# Patient Record
Sex: FEMALE | Race: WHITE | Employment: UNEMPLOYED | URBAN - METROPOLITAN AREA
[De-identification: names, ages, dates, MRNs, and addresses within clinical notes are randomized per-mention and may not be internally consistent; named-entity substitution may affect disease eponyms.]

---

## 2020-03-26 ENCOUNTER — PREP FOR PROCEDURE (OUTPATIENT)
Dept: UROLOGY | Facility: CLINIC | Age: 50
End: 2020-03-26

## 2020-03-26 ENCOUNTER — APPOINTMENT (OUTPATIENT)
Dept: GENERAL RADIOLOGY | Facility: CLINIC | Age: 50
DRG: 854 | End: 2020-03-26
Attending: INTERNAL MEDICINE
Payer: OTHER GOVERNMENT

## 2020-03-26 ENCOUNTER — APPOINTMENT (OUTPATIENT)
Dept: CT IMAGING | Facility: CLINIC | Age: 50
DRG: 854 | End: 2020-03-26
Attending: EMERGENCY MEDICINE
Payer: OTHER GOVERNMENT

## 2020-03-26 ENCOUNTER — ANESTHESIA EVENT (OUTPATIENT)
Dept: SURGERY | Facility: CLINIC | Age: 50
DRG: 854 | End: 2020-03-26
Payer: OTHER GOVERNMENT

## 2020-03-26 ENCOUNTER — ANESTHESIA (OUTPATIENT)
Dept: SURGERY | Facility: CLINIC | Age: 50
DRG: 854 | End: 2020-03-26
Payer: OTHER GOVERNMENT

## 2020-03-26 ENCOUNTER — HOSPITAL ENCOUNTER (INPATIENT)
Facility: CLINIC | Age: 50
LOS: 2 days | Discharge: HOME | DRG: 854 | End: 2020-03-28
Attending: EMERGENCY MEDICINE | Admitting: INTERNAL MEDICINE
Payer: OTHER GOVERNMENT

## 2020-03-26 DIAGNOSIS — N20.1 LEFT URETERAL CALCULUS: Primary | ICD-10-CM

## 2020-03-26 DIAGNOSIS — N20.1 URETEROLITHIASIS: Primary | ICD-10-CM

## 2020-03-26 DIAGNOSIS — N20.1 LEFT URETERAL CALCULUS: ICD-10-CM

## 2020-03-26 DIAGNOSIS — N20.0 KIDNEY STONE ON LEFT SIDE: ICD-10-CM

## 2020-03-26 DIAGNOSIS — A41.9 BACTERIAL SEPSIS (H): ICD-10-CM

## 2020-03-26 LAB
ALBUMIN UR-MCNC: 10 MG/DL
ANION GAP SERPL CALCULATED.3IONS-SCNC: 7 MMOL/L (ref 3–14)
APPEARANCE UR: ABNORMAL
BACTERIA #/AREA URNS HPF: ABNORMAL /HPF
BASOPHILS # BLD AUTO: 0.1 10E9/L (ref 0–0.2)
BASOPHILS NFR BLD AUTO: 0.4 %
BILIRUB UR QL STRIP: NEGATIVE
BUN SERPL-MCNC: 22 MG/DL (ref 7–30)
CALCIUM SERPL-MCNC: 8.9 MG/DL (ref 8.5–10.1)
CHLORIDE SERPL-SCNC: 106 MMOL/L (ref 94–109)
CO2 SERPL-SCNC: 23 MMOL/L (ref 20–32)
COLOR UR AUTO: ABNORMAL
CREAT SERPL-MCNC: 1.07 MG/DL (ref 0.52–1.04)
DIFFERENTIAL METHOD BLD: ABNORMAL
EOSINOPHIL # BLD AUTO: 0.2 10E9/L (ref 0–0.7)
EOSINOPHIL NFR BLD AUTO: 1.2 %
ERYTHROCYTE [DISTWIDTH] IN BLOOD BY AUTOMATED COUNT: 14.2 % (ref 10–15)
GFR SERPL CREATININE-BSD FRML MDRD: 62 ML/MIN/{1.73_M2}
GLUCOSE SERPL-MCNC: 104 MG/DL (ref 70–99)
GLUCOSE UR STRIP-MCNC: NEGATIVE MG/DL
HCT VFR BLD AUTO: 42.6 % (ref 35–47)
HGB BLD-MCNC: 14.2 G/DL (ref 11.7–15.7)
HGB UR QL STRIP: ABNORMAL
IMM GRANULOCYTES # BLD: 0.1 10E9/L (ref 0–0.4)
IMM GRANULOCYTES NFR BLD: 0.4 %
KETONES UR STRIP-MCNC: 20 MG/DL
LACTATE BLD-SCNC: 1.2 MMOL/L (ref 0.7–2)
LEUKOCYTE ESTERASE UR QL STRIP: ABNORMAL
LYMPHOCYTES # BLD AUTO: 2.2 10E9/L (ref 0.8–5.3)
LYMPHOCYTES NFR BLD AUTO: 11.9 %
MCH RBC QN AUTO: 31.8 PG (ref 26.5–33)
MCHC RBC AUTO-ENTMCNC: 33.3 G/DL (ref 31.5–36.5)
MCV RBC AUTO: 96 FL (ref 78–100)
MONOCYTES # BLD AUTO: 1.7 10E9/L (ref 0–1.3)
MONOCYTES NFR BLD AUTO: 9.5 %
NEUTROPHILS # BLD AUTO: 13.9 10E9/L (ref 1.6–8.3)
NEUTROPHILS NFR BLD AUTO: 76.6 %
NITRATE UR QL: NEGATIVE
NRBC # BLD AUTO: 0 10*3/UL
NRBC BLD AUTO-RTO: 0 /100
PH UR STRIP: 5.5 PH (ref 5–7)
PLATELET # BLD AUTO: 263 10E9/L (ref 150–450)
POTASSIUM SERPL-SCNC: 3.8 MMOL/L (ref 3.4–5.3)
RBC # BLD AUTO: 4.46 10E12/L (ref 3.8–5.2)
RBC #/AREA URNS AUTO: 9 /HPF (ref 0–2)
SODIUM SERPL-SCNC: 136 MMOL/L (ref 133–144)
SOURCE: ABNORMAL
SP GR UR STRIP: 1.02 (ref 1–1.03)
SQUAMOUS #/AREA URNS AUTO: 2 /HPF (ref 0–1)
UROBILINOGEN UR STRIP-MCNC: NORMAL MG/DL (ref 0–2)
WBC # BLD AUTO: 18.2 10E9/L (ref 4–11)
WBC #/AREA URNS AUTO: >182 /HPF (ref 0–5)
WBC CLUMPS #/AREA URNS HPF: PRESENT /HPF

## 2020-03-26 PROCEDURE — 87086 URINE CULTURE/COLONY COUNT: CPT | Performed by: EMERGENCY MEDICINE

## 2020-03-26 PROCEDURE — 40000277 XR SURGERY CARM FLUORO LESS THAN 5 MIN W STILLS: Mod: TC

## 2020-03-26 PROCEDURE — 36000054 ZZH SURGERY LEVEL 2 W FLUORO 1ST 30 MIN: Performed by: UROLOGY

## 2020-03-26 PROCEDURE — 85025 COMPLETE CBC W/AUTO DIFF WBC: CPT | Performed by: EMERGENCY MEDICINE

## 2020-03-26 PROCEDURE — 37000008 ZZH ANESTHESIA TECHNICAL FEE, 1ST 30 MIN: Performed by: UROLOGY

## 2020-03-26 PROCEDURE — 25000128 H RX IP 250 OP 636: Performed by: UROLOGY

## 2020-03-26 PROCEDURE — 25800030 ZZH RX IP 258 OP 636: Performed by: INTERNAL MEDICINE

## 2020-03-26 PROCEDURE — 87088 URINE BACTERIA CULTURE: CPT | Performed by: EMERGENCY MEDICINE

## 2020-03-26 PROCEDURE — 37000009 ZZH ANESTHESIA TECHNICAL FEE, EACH ADDTL 15 MIN: Performed by: UROLOGY

## 2020-03-26 PROCEDURE — 25000132 ZZH RX MED GY IP 250 OP 250 PS 637: Performed by: INTERNAL MEDICINE

## 2020-03-26 PROCEDURE — 25000125 ZZHC RX 250: Performed by: ANESTHESIOLOGY

## 2020-03-26 PROCEDURE — 12000013 ZZH R&B PEDS

## 2020-03-26 PROCEDURE — 99285 EMERGENCY DEPT VISIT HI MDM: CPT | Mod: 25

## 2020-03-26 PROCEDURE — 25000125 ZZHC RX 250: Performed by: NURSE ANESTHETIST, CERTIFIED REGISTERED

## 2020-03-26 PROCEDURE — 87186 SC STD MICRODIL/AGAR DIL: CPT | Performed by: EMERGENCY MEDICINE

## 2020-03-26 PROCEDURE — 25000128 H RX IP 250 OP 636: Performed by: NURSE ANESTHETIST, CERTIFIED REGISTERED

## 2020-03-26 PROCEDURE — 27210794 ZZH OR GENERAL SUPPLY STERILE: Performed by: UROLOGY

## 2020-03-26 PROCEDURE — 80048 BASIC METABOLIC PNL TOTAL CA: CPT | Performed by: EMERGENCY MEDICINE

## 2020-03-26 PROCEDURE — 99223 1ST HOSP IP/OBS HIGH 75: CPT | Mod: AI | Performed by: INTERNAL MEDICINE

## 2020-03-26 PROCEDURE — 83605 ASSAY OF LACTIC ACID: CPT | Performed by: EMERGENCY MEDICINE

## 2020-03-26 PROCEDURE — 25000132 ZZH RX MED GY IP 250 OP 250 PS 637: Performed by: UROLOGY

## 2020-03-26 PROCEDURE — 96365 THER/PROPH/DIAG IV INF INIT: CPT

## 2020-03-26 PROCEDURE — 0T778DZ DILATION OF LEFT URETER WITH INTRALUMINAL DEVICE, VIA NATURAL OR ARTIFICIAL OPENING ENDOSCOPIC: ICD-10-PCS | Performed by: UROLOGY

## 2020-03-26 PROCEDURE — 74176 CT ABD & PELVIS W/O CONTRAST: CPT

## 2020-03-26 PROCEDURE — 96361 HYDRATE IV INFUSION ADD-ON: CPT

## 2020-03-26 PROCEDURE — 81001 URINALYSIS AUTO W/SCOPE: CPT | Performed by: EMERGENCY MEDICINE

## 2020-03-26 PROCEDURE — 25800025 ZZH RX 258: Performed by: UROLOGY

## 2020-03-26 PROCEDURE — C2617 STENT, NON-COR, TEM W/O DEL: HCPCS | Performed by: UROLOGY

## 2020-03-26 PROCEDURE — 99232 SBSQ HOSP IP/OBS MODERATE 35: CPT | Performed by: UROLOGY

## 2020-03-26 PROCEDURE — 96375 TX/PRO/DX INJ NEW DRUG ADDON: CPT

## 2020-03-26 PROCEDURE — 87040 BLOOD CULTURE FOR BACTERIA: CPT | Performed by: EMERGENCY MEDICINE

## 2020-03-26 PROCEDURE — 40000306 ZZH STATISTIC PRE PROC ASSESS II: Performed by: UROLOGY

## 2020-03-26 PROCEDURE — 25000128 H RX IP 250 OP 636: Performed by: EMERGENCY MEDICINE

## 2020-03-26 PROCEDURE — 25800030 ZZH RX IP 258 OP 636: Performed by: EMERGENCY MEDICINE

## 2020-03-26 PROCEDURE — 71000012 ZZH RECOVERY PHASE 1 LEVEL 1 FIRST HR: Performed by: UROLOGY

## 2020-03-26 PROCEDURE — 52332 CYSTOSCOPY AND TREATMENT: CPT | Mod: LT | Performed by: UROLOGY

## 2020-03-26 PROCEDURE — 25000128 H RX IP 250 OP 636: Performed by: INTERNAL MEDICINE

## 2020-03-26 DEVICE — STENT URETERAL POLARIS ULTRA 5FRX24CM M0061921220: Type: IMPLANTABLE DEVICE | Site: URETER | Status: FUNCTIONAL

## 2020-03-26 RX ORDER — ACETAMINOPHEN 500 MG
1000 TABLET ORAL EVERY 6 HOURS PRN
COMMUNITY

## 2020-03-26 RX ORDER — ONDANSETRON 2 MG/ML
4 INJECTION INTRAMUSCULAR; INTRAVENOUS ONCE
Status: COMPLETED | OUTPATIENT
Start: 2020-03-26 | End: 2020-03-26

## 2020-03-26 RX ORDER — AMLODIPINE BESYLATE 10 MG/1
10 TABLET ORAL DAILY
Status: ON HOLD | COMMUNITY
End: 2021-01-15

## 2020-03-26 RX ORDER — CEFAZOLIN SODIUM 1 G/50ML
1 INJECTION, SOLUTION INTRAVENOUS SEE ADMIN INSTRUCTIONS
Status: DISCONTINUED | OUTPATIENT
Start: 2020-03-26 | End: 2020-03-28

## 2020-03-26 RX ORDER — ONDANSETRON 2 MG/ML
4 INJECTION INTRAMUSCULAR; INTRAVENOUS EVERY 30 MIN PRN
Status: DISCONTINUED | OUTPATIENT
Start: 2020-03-26 | End: 2020-03-26 | Stop reason: HOSPADM

## 2020-03-26 RX ORDER — CEFAZOLIN SODIUM 2 G/100ML
2 INJECTION, SOLUTION INTRAVENOUS
Status: COMPLETED | OUTPATIENT
Start: 2020-03-26 | End: 2020-03-26

## 2020-03-26 RX ORDER — FOLIC ACID 1 MG/1
1 TABLET ORAL DAILY
COMMUNITY

## 2020-03-26 RX ORDER — OXYCODONE HYDROCHLORIDE 5 MG/1
5 TABLET ORAL EVERY 4 HOURS PRN
Status: DISCONTINUED | OUTPATIENT
Start: 2020-03-26 | End: 2020-03-28 | Stop reason: HOSPADM

## 2020-03-26 RX ORDER — FENTANYL CITRATE 50 UG/ML
25-50 INJECTION, SOLUTION INTRAMUSCULAR; INTRAVENOUS
Status: DISCONTINUED | OUTPATIENT
Start: 2020-03-26 | End: 2020-03-26 | Stop reason: HOSPADM

## 2020-03-26 RX ORDER — CETIRIZINE HYDROCHLORIDE 10 MG/1
10 TABLET ORAL DAILY
COMMUNITY

## 2020-03-26 RX ORDER — CEFAZOLIN SODIUM 1 G
1 VIAL (EA) INJECTION SEE ADMIN INSTRUCTIONS
Status: CANCELLED | OUTPATIENT
Start: 2020-03-26

## 2020-03-26 RX ORDER — KETOROLAC TROMETHAMINE 15 MG/ML
15 INJECTION, SOLUTION INTRAMUSCULAR; INTRAVENOUS ONCE
Status: COMPLETED | OUTPATIENT
Start: 2020-03-26 | End: 2020-03-26

## 2020-03-26 RX ORDER — ONDANSETRON 4 MG/1
4 TABLET, ORALLY DISINTEGRATING ORAL EVERY 30 MIN PRN
Status: DISCONTINUED | OUTPATIENT
Start: 2020-03-26 | End: 2020-03-26 | Stop reason: HOSPADM

## 2020-03-26 RX ORDER — LIDOCAINE 40 MG/G
CREAM TOPICAL
Status: DISCONTINUED | OUTPATIENT
Start: 2020-03-26 | End: 2020-03-28

## 2020-03-26 RX ORDER — KETOROLAC TROMETHAMINE 15 MG/ML
15 INJECTION, SOLUTION INTRAMUSCULAR; INTRAVENOUS EVERY 6 HOURS PRN
Status: DISCONTINUED | OUTPATIENT
Start: 2020-03-26 | End: 2020-03-26

## 2020-03-26 RX ORDER — POLYETHYLENE GLYCOL 3350 17 G/17G
17 POWDER, FOR SOLUTION ORAL DAILY PRN
Status: DISCONTINUED | OUTPATIENT
Start: 2020-03-26 | End: 2020-03-28 | Stop reason: HOSPADM

## 2020-03-26 RX ORDER — METHOTREXATE SODIUM 2.5 MG/1
12.5 TABLET ORAL WEEKLY
COMMUNITY

## 2020-03-26 RX ORDER — SCOLOPAMINE TRANSDERMAL SYSTEM 1 MG/1
1 PATCH, EXTENDED RELEASE TRANSDERMAL
Status: DISCONTINUED | OUTPATIENT
Start: 2020-03-26 | End: 2020-03-28 | Stop reason: HOSPADM

## 2020-03-26 RX ORDER — ONDANSETRON 2 MG/ML
INJECTION INTRAMUSCULAR; INTRAVENOUS PRN
Status: DISCONTINUED | OUTPATIENT
Start: 2020-03-26 | End: 2020-03-26

## 2020-03-26 RX ORDER — FLUTICASONE PROPIONATE 50 MCG
2 SPRAY, SUSPENSION (ML) NASAL DAILY PRN
COMMUNITY

## 2020-03-26 RX ORDER — FUROSEMIDE 20 MG/1
10 TABLET ORAL DAILY PRN
Status: ON HOLD | COMMUNITY
End: 2021-01-15

## 2020-03-26 RX ORDER — ONDANSETRON 4 MG/1
4 TABLET, ORALLY DISINTEGRATING ORAL EVERY 6 HOURS PRN
Status: DISCONTINUED | OUTPATIENT
Start: 2020-03-26 | End: 2020-03-28 | Stop reason: HOSPADM

## 2020-03-26 RX ORDER — MULTIVIT-MIN/IRON/FOLIC ACID/K 18-600-40
2000 CAPSULE ORAL DAILY
COMMUNITY

## 2020-03-26 RX ORDER — KETOROLAC TROMETHAMINE 15 MG/ML
15 INJECTION, SOLUTION INTRAMUSCULAR; INTRAVENOUS EVERY 6 HOURS PRN
Status: DISCONTINUED | OUTPATIENT
Start: 2020-03-26 | End: 2020-03-28 | Stop reason: HOSPADM

## 2020-03-26 RX ORDER — DEXAMETHASONE SODIUM PHOSPHATE 4 MG/ML
INJECTION, SOLUTION INTRA-ARTICULAR; INTRALESIONAL; INTRAMUSCULAR; INTRAVENOUS; SOFT TISSUE PRN
Status: DISCONTINUED | OUTPATIENT
Start: 2020-03-26 | End: 2020-03-26

## 2020-03-26 RX ORDER — NALOXONE HYDROCHLORIDE 0.4 MG/ML
.1-.4 INJECTION, SOLUTION INTRAMUSCULAR; INTRAVENOUS; SUBCUTANEOUS
Status: ACTIVE | OUTPATIENT
Start: 2020-03-26 | End: 2020-03-27

## 2020-03-26 RX ORDER — LABETALOL 20 MG/4 ML (5 MG/ML) INTRAVENOUS SYRINGE
10
Status: DISCONTINUED | OUTPATIENT
Start: 2020-03-26 | End: 2020-03-26 | Stop reason: HOSPADM

## 2020-03-26 RX ORDER — HYDROMORPHONE HYDROCHLORIDE 1 MG/ML
.3-.5 INJECTION, SOLUTION INTRAMUSCULAR; INTRAVENOUS; SUBCUTANEOUS EVERY 5 MIN PRN
Status: DISCONTINUED | OUTPATIENT
Start: 2020-03-26 | End: 2020-03-26 | Stop reason: HOSPADM

## 2020-03-26 RX ORDER — IBUPROFEN 200 MG
2 CAPSULE ORAL DAILY
COMMUNITY

## 2020-03-26 RX ORDER — MULTIPLE VITAMINS W/ MINERALS TAB 9MG-400MCG
1 TAB ORAL DAILY
COMMUNITY

## 2020-03-26 RX ORDER — BIOTIN 1 MG
1000 TABLET ORAL DAILY
COMMUNITY

## 2020-03-26 RX ORDER — ACETAMINOPHEN 325 MG/1
650 TABLET ORAL EVERY 4 HOURS PRN
Status: DISCONTINUED | OUTPATIENT
Start: 2020-03-26 | End: 2020-03-28 | Stop reason: HOSPADM

## 2020-03-26 RX ORDER — NALOXONE HYDROCHLORIDE 0.4 MG/ML
.1-.4 INJECTION, SOLUTION INTRAMUSCULAR; INTRAVENOUS; SUBCUTANEOUS
Status: DISCONTINUED | OUTPATIENT
Start: 2020-03-26 | End: 2020-03-28 | Stop reason: HOSPADM

## 2020-03-26 RX ORDER — HYDROMORPHONE HYDROCHLORIDE 1 MG/ML
0.5 INJECTION, SOLUTION INTRAMUSCULAR; INTRAVENOUS; SUBCUTANEOUS
Status: DISCONTINUED | OUTPATIENT
Start: 2020-03-26 | End: 2020-03-26

## 2020-03-26 RX ORDER — SODIUM CHLORIDE, SODIUM LACTATE, POTASSIUM CHLORIDE, CALCIUM CHLORIDE 600; 310; 30; 20 MG/100ML; MG/100ML; MG/100ML; MG/100ML
INJECTION, SOLUTION INTRAVENOUS CONTINUOUS
Status: DISCONTINUED | OUTPATIENT
Start: 2020-03-26 | End: 2020-03-26 | Stop reason: HOSPADM

## 2020-03-26 RX ORDER — PHENYLEPHRINE HYDROCHLORIDE 10 MG/ML
INJECTION INTRAVENOUS PRN
Status: DISCONTINUED | OUTPATIENT
Start: 2020-03-26 | End: 2020-03-26

## 2020-03-26 RX ORDER — LIDOCAINE HYDROCHLORIDE 10 MG/ML
INJECTION, SOLUTION INFILTRATION; PERINEURAL PRN
Status: DISCONTINUED | OUTPATIENT
Start: 2020-03-26 | End: 2020-03-26

## 2020-03-26 RX ORDER — PROPOFOL 10 MG/ML
INJECTION, EMULSION INTRAVENOUS PRN
Status: DISCONTINUED | OUTPATIENT
Start: 2020-03-26 | End: 2020-03-26

## 2020-03-26 RX ORDER — SODIUM CHLORIDE 9 MG/ML
INJECTION, SOLUTION INTRAVENOUS CONTINUOUS
Status: DISCONTINUED | OUTPATIENT
Start: 2020-03-26 | End: 2020-03-28 | Stop reason: HOSPADM

## 2020-03-26 RX ORDER — SODIUM CHLORIDE, SODIUM LACTATE, POTASSIUM CHLORIDE, CALCIUM CHLORIDE 600; 310; 30; 20 MG/100ML; MG/100ML; MG/100ML; MG/100ML
INJECTION, SOLUTION INTRAVENOUS CONTINUOUS
Status: DISCONTINUED | OUTPATIENT
Start: 2020-03-26 | End: 2020-03-26

## 2020-03-26 RX ORDER — FENTANYL CITRATE 50 UG/ML
INJECTION, SOLUTION INTRAMUSCULAR; INTRAVENOUS PRN
Status: DISCONTINUED | OUTPATIENT
Start: 2020-03-26 | End: 2020-03-26

## 2020-03-26 RX ORDER — HYDROMORPHONE HYDROCHLORIDE 1 MG/ML
.3-.5 INJECTION, SOLUTION INTRAMUSCULAR; INTRAVENOUS; SUBCUTANEOUS
Status: DISCONTINUED | OUTPATIENT
Start: 2020-03-26 | End: 2020-03-28 | Stop reason: HOSPADM

## 2020-03-26 RX ORDER — HYDRALAZINE HYDROCHLORIDE 20 MG/ML
2.5-5 INJECTION INTRAMUSCULAR; INTRAVENOUS EVERY 10 MIN PRN
Status: DISCONTINUED | OUTPATIENT
Start: 2020-03-26 | End: 2020-03-26 | Stop reason: HOSPADM

## 2020-03-26 RX ORDER — LIDOCAINE 40 MG/G
CREAM TOPICAL
Status: DISCONTINUED | OUTPATIENT
Start: 2020-03-26 | End: 2020-03-28 | Stop reason: HOSPADM

## 2020-03-26 RX ORDER — CEFTRIAXONE 2 G/1
2 INJECTION, POWDER, FOR SOLUTION INTRAMUSCULAR; INTRAVENOUS ONCE
Status: COMPLETED | OUTPATIENT
Start: 2020-03-26 | End: 2020-03-26

## 2020-03-26 RX ORDER — LANOLIN ALCOHOL/MO/W.PET/CERES
1000 CREAM (GRAM) TOPICAL WEEKLY
COMMUNITY

## 2020-03-26 RX ORDER — RIZATRIPTAN BENZOATE 10 MG/1
10 TABLET ORAL
COMMUNITY

## 2020-03-26 RX ORDER — ONDANSETRON 2 MG/ML
4 INJECTION INTRAMUSCULAR; INTRAVENOUS EVERY 6 HOURS PRN
Status: DISCONTINUED | OUTPATIENT
Start: 2020-03-26 | End: 2020-03-28 | Stop reason: HOSPADM

## 2020-03-26 RX ORDER — CEFTRIAXONE 1 G/1
1 INJECTION, POWDER, FOR SOLUTION INTRAMUSCULAR; INTRAVENOUS EVERY 24 HOURS
Status: DISCONTINUED | OUTPATIENT
Start: 2020-03-27 | End: 2020-03-28 | Stop reason: HOSPADM

## 2020-03-26 RX ADMIN — ACETAMINOPHEN 650 MG: 325 TABLET, FILM COATED ORAL at 08:06

## 2020-03-26 RX ADMIN — PHENYLEPHRINE HYDROCHLORIDE 100 MCG: 10 INJECTION INTRAVENOUS at 09:51

## 2020-03-26 RX ADMIN — PROPOFOL 180 MG: 10 INJECTION, EMULSION INTRAVENOUS at 09:33

## 2020-03-26 RX ADMIN — KETOROLAC TROMETHAMINE 15 MG: 15 INJECTION, SOLUTION INTRAMUSCULAR; INTRAVENOUS at 15:01

## 2020-03-26 RX ADMIN — LIDOCAINE HYDROCHLORIDE 40 MG: 10 INJECTION, SOLUTION INFILTRATION; PERINEURAL at 09:33

## 2020-03-26 RX ADMIN — SODIUM CHLORIDE, POTASSIUM CHLORIDE, SODIUM LACTATE AND CALCIUM CHLORIDE: 600; 310; 30; 20 INJECTION, SOLUTION INTRAVENOUS at 09:30

## 2020-03-26 RX ADMIN — SODIUM CHLORIDE 1000 ML: 9 INJECTION, SOLUTION INTRAVENOUS at 00:50

## 2020-03-26 RX ADMIN — ONDANSETRON HYDROCHLORIDE 4 MG: 2 INJECTION, SOLUTION INTRAVENOUS at 09:43

## 2020-03-26 RX ADMIN — CEFAZOLIN SODIUM 2 G: 2 INJECTION, SOLUTION INTRAVENOUS at 09:30

## 2020-03-26 RX ADMIN — PHENYLEPHRINE HYDROCHLORIDE 100 MCG: 10 INJECTION INTRAVENOUS at 09:45

## 2020-03-26 RX ADMIN — FENTANYL CITRATE 100 MCG: 50 INJECTION, SOLUTION INTRAMUSCULAR; INTRAVENOUS at 09:33

## 2020-03-26 RX ADMIN — DEXAMETHASONE SODIUM PHOSPHATE 4 MG: 4 INJECTION, SOLUTION INTRA-ARTICULAR; INTRALESIONAL; INTRAMUSCULAR; INTRAVENOUS; SOFT TISSUE at 09:33

## 2020-03-26 RX ADMIN — HYDROMORPHONE HYDROCHLORIDE 0.5 MG: 1 INJECTION, SOLUTION INTRAMUSCULAR; INTRAVENOUS; SUBCUTANEOUS at 04:24

## 2020-03-26 RX ADMIN — SCOPALAMINE 1 PATCH: 1 PATCH, EXTENDED RELEASE TRANSDERMAL at 09:15

## 2020-03-26 RX ADMIN — KETOROLAC TROMETHAMINE 15 MG: 15 INJECTION, SOLUTION INTRAMUSCULAR; INTRAVENOUS at 00:47

## 2020-03-26 RX ADMIN — HYDROMORPHONE HYDROCHLORIDE 0.5 MG: 1 INJECTION, SOLUTION INTRAMUSCULAR; INTRAVENOUS; SUBCUTANEOUS at 17:28

## 2020-03-26 RX ADMIN — HYDROMORPHONE HYDROCHLORIDE 0.3 MG: 1 INJECTION, SOLUTION INTRAMUSCULAR; INTRAVENOUS; SUBCUTANEOUS at 15:11

## 2020-03-26 RX ADMIN — HYDROMORPHONE HYDROCHLORIDE 0.5 MG: 1 INJECTION, SOLUTION INTRAMUSCULAR; INTRAVENOUS; SUBCUTANEOUS at 21:29

## 2020-03-26 RX ADMIN — ONDANSETRON 4 MG: 2 INJECTION INTRAMUSCULAR; INTRAVENOUS at 07:24

## 2020-03-26 RX ADMIN — SODIUM CHLORIDE 1000 ML: 9 INJECTION, SOLUTION INTRAVENOUS at 18:30

## 2020-03-26 RX ADMIN — OXYCODONE HYDROCHLORIDE 5 MG: 5 TABLET ORAL at 18:36

## 2020-03-26 RX ADMIN — KETOROLAC TROMETHAMINE 15 MG: 15 INJECTION, SOLUTION INTRAMUSCULAR; INTRAVENOUS at 07:29

## 2020-03-26 RX ADMIN — ONDANSETRON 4 MG: 2 INJECTION INTRAMUSCULAR; INTRAVENOUS at 00:47

## 2020-03-26 RX ADMIN — ACETAMINOPHEN 650 MG: 325 TABLET, FILM COATED ORAL at 18:33

## 2020-03-26 RX ADMIN — CEFTRIAXONE 2 G: 2 INJECTION, POWDER, FOR SOLUTION INTRAMUSCULAR; INTRAVENOUS at 01:40

## 2020-03-26 RX ADMIN — SODIUM CHLORIDE, POTASSIUM CHLORIDE, SODIUM LACTATE AND CALCIUM CHLORIDE: 600; 310; 30; 20 INJECTION, SOLUTION INTRAVENOUS at 03:04

## 2020-03-26 RX ADMIN — PHENYLEPHRINE HYDROCHLORIDE 100 MCG: 10 INJECTION INTRAVENOUS at 09:40

## 2020-03-26 RX ADMIN — SODIUM CHLORIDE, POTASSIUM CHLORIDE, SODIUM LACTATE AND CALCIUM CHLORIDE: 600; 310; 30; 20 INJECTION, SOLUTION INTRAVENOUS at 09:50

## 2020-03-26 RX ADMIN — HYDROMORPHONE HYDROCHLORIDE 0.5 MG: 1 INJECTION, SOLUTION INTRAMUSCULAR; INTRAVENOUS; SUBCUTANEOUS at 00:45

## 2020-03-26 RX ADMIN — SODIUM CHLORIDE: 9 INJECTION, SOLUTION INTRAVENOUS at 12:08

## 2020-03-26 RX ADMIN — MIDAZOLAM 2 MG: 1 INJECTION INTRAMUSCULAR; INTRAVENOUS at 09:34

## 2020-03-26 RX ADMIN — OXYCODONE HYDROCHLORIDE 5 MG: 5 TABLET ORAL at 12:07

## 2020-03-26 ASSESSMENT — ACTIVITIES OF DAILY LIVING (ADL)
DRESS: 0-->INDEPENDENT
RETIRED_EATING: 0-->INDEPENDENT
BATHING: 0-->INDEPENDENT
FALL_HISTORY_WITHIN_LAST_SIX_MONTHS: NO
TOILETING: 0-->INDEPENDENT
RETIRED_COMMUNICATION: 0-->UNDERSTANDS/COMMUNICATES WITHOUT DIFFICULTY
TRANSFERRING: 0-->INDEPENDENT
SWALLOWING: 0-->SWALLOWS FOODS/LIQUIDS WITHOUT DIFFICULTY
COGNITION: 0 - NO COGNITION ISSUES REPORTED
AMBULATION: 0-->INDEPENDENT

## 2020-03-26 ASSESSMENT — ENCOUNTER SYMPTOMS
FLANK PAIN: 1
HEMATURIA: 0
FREQUENCY: 1
COUGH: 0
CHILLS: 1
FEVER: 0
VOMITING: 0
DYSRHYTHMIAS: 0
NAUSEA: 1
SHORTNESS OF BREATH: 0
SORE THROAT: 0
ABDOMINAL PAIN: 1
SEIZURES: 0
DYSURIA: 0

## 2020-03-26 ASSESSMENT — MIFFLIN-ST. JEOR
SCORE: 1563.48
SCORE: 1563.23

## 2020-03-26 ASSESSMENT — COPD QUESTIONNAIRES: COPD: 0

## 2020-03-26 ASSESSMENT — LIFESTYLE VARIABLES: TOBACCO_USE: 0

## 2020-03-26 NOTE — ED PROVIDER NOTES
History     Chief Complaint:  Flank Pain    HPI   Lisa Betancourt is a 49 year old female with a history of  RA on Humira and methotrexate, kidney stones who presents with flank pain. The patient states that yesterday she started to develop a dull ache and in her left flank. She states that today the pain was now radiated to her abdomen. The patient states that she has had some associated nausea but no vomiting. She reports an increasing urinary frequency but denies any dysuria, or hematuria. The patient also reports some chills but denies a fever. The patient denies any respiratory symptoms. The patient last took Tylenol at 2100 with no relief.     Allergies:  NSAIDS    Medications:    Tylenol  Humira  Synthroid   Prilosec   Amlodipine   Methotrexate     Past Medical History:    Hypothyroid   RA  kidney stone  Hypertension  Obesity   Depression   Psoriasis     Past Surgical History:    Left breast lumpectomy   C section   Hernia repair X2   Tubal Ligation   Gastric sleeve   Tonsillectomy and adenoidectomy     Family History:    Breast cancer- mother  Hypertension- mother  Psoriasis- mother   Thyroid disorder- mother   Migraines- sister     Social History:  Smoking Status: Never Smoker  Smokeless Tobacco: Never Used  Alcohol Use: Positive  Marital Status:        Review of Systems   Constitutional: Positive for chills. Negative for fever.   HENT: Negative for congestion and sore throat.    Respiratory: Negative for cough and shortness of breath.    Gastrointestinal: Positive for abdominal pain and nausea. Negative for vomiting.   Genitourinary: Positive for flank pain and frequency. Negative for dysuria and hematuria.   All other systems reviewed and are negative.      Physical Exam     Patient Vitals for the past 24 hrs:   BP Temp Temp src Pulse Heart Rate Resp SpO2 Height Weight   03/26/20 0115 131/76 -- -- 76 -- -- 94 % -- --   03/26/20 0055 -- -- -- -- -- -- 96 % -- --   03/26/20 0013 (!) 159/97 99.6  F  "(37.6  C) Oral 105 105 18 98 % 1.676 m (5' 6\") 91.2 kg (201 lb)       Physical Exam    Nursing note and vitals reviewed.  Constitutional: Cooperative.   HENT:   Mouth/Throat: Moist mucous membranes.   Eyes: EOMI, nonicteric sclera  Cardiovascular: Normal rate, regular rhythm, no murmurs, rubs, or gallops  Pulmonary/Chest: Effort normal and breath sounds normal. No respiratory distress. No wheezes. No rales.   Abdominal: Soft. Nontender, nondistended, no guarding or rigidity. BS present. Left-sided CVA tenderness.   Musculoskeletal: Normal range of motion.   Neurological: Alert. Moves all extremities spontaneously.   Skin: Skin is warm and dry. No rash noted.   Psychiatric: Normal mood and affect.     Emergency Department Course     Imaging:  Radiology findings were communicated with the patient who voiced understanding of the findings.    Abdomen / Pelvis CT no contrast:  1.  There is 5 mm left UPJ stone causing mild hydronephrosis, with mild left renal enlargement and perinephric edema, likely related to obstruction. Additional punctate stones are seen in the left lower kidney and single punctate stone in the right lower    kidney. No right hydronephrosis.   2.  Status post partial/sleeve gastrectomy. No acute findings of the bowel.   Imaging independently reviewed and agree with radiologist interpretation.     Laboratory:  Laboratory findings were communicated with the patient who voiced understanding of the findings.    CBC: WBC 18.2(H), HGB 14.2,   BMP: glucose 104 (H) o/w WNL (Creatinine 1.07(H))    Lactic Acid (Collected at 0051): 1.2    Blood culture: pending X2    UA with micro: ketones moderate (A) protein albumin 10 (A) blood moderate (A) protein albumin 10 (A) leukocyte esterase large (A) WBC/HPF >182 (H) RBC/HPF 9 (H) WBC clumps present (A) bacteria few (A) squamous/HPF 2 (H) o/w negative    Urine Culture: pending     Interventions:  0045 Dilaudid 0.5 mg IV   0047 Zofran 4 mg IV  0047 toradol 15 " mg IV   0050 NS, 1 L, IV  0140 rocephin 2 g IV     Emergency Department Course:     Nursing notes and vitals reviewed.    0017 I performed an exam of the patient as documented above.     0051 IV was inserted and blood was drawn for laboratory testing, results above.    0051 The patient provided a urine sample here in the emergency department. This was sent for laboratory testing, findings above.    0103 The patient was sent for a CT while in the emergency department, results above.     0134 I returned to check on patient.  I personally reviewed the laboratory and imaging results with the patient and answered all related questions prior to admit.     0138 I spoke with Dr. White of urology regarding patient's presentation, findings, and plan of care.     0140 I returned to check on patient.  I updated the patient on urology's consult.     20748  I spoke with Dr. Guillermo of the Hospitalist service from [facility] regarding patient's presentation, findings, and plan of care.    Impression & Plan      Medical Decision Making:  Lisa Betancourt is a 49 year old female who presents to the emergency department today for evaluation of left flank pain.  Patient seems febrile on exam and temp is elevated, though not to 100.4.  She has left-sided flank tenderness.  Given fever, concern for multiple infectious etiologies.  Urinalysis suggests infection, and CT scan shows an obstructing stone at the left UPJ.  Patient does have significant leukocytosis and she presented tachycardic meeting criteria for diagnosis of sepsis.  Blood cultures were drawn and she was given 2 g of Rocephin.  I spoke with Dr. White from urology who recommends patient be made n.p.o. for likely operative intervention in the morning if she does not pass her stone in the meantime.  No signs of severe sepsis or septic shock at this time.  Discussed with Dr. Guillermo from our hospitalist service who accepts patient for admission.  All patient's questions  were answered and she is in agreement with the plan.    Diagnosis:    ICD-10-CM    1. Kidney stone on left side  N20.0    2. Bacterial sepsis (H)  A41.9      Disposition:   Findings and plan explained to the Patient who consents to admission. Discussed the patient with Dr. Guillermo, who will admit the patient to a medical bed for further monitoring, evaluation, and treatment.    Scribe Disclosure:  I, Maggie Alcala, am serving as a scribe at 12:32 AM on 3/26/2020 to document services personally performed by Ayo Robins MD based on my observations and the provider's statements to me.    [facility] EMERGENCY DEPARTMENT       Ayo Robins MD  03/26/20 1682

## 2020-03-26 NOTE — ANESTHESIA POSTPROCEDURE EVALUATION
Patient: Lisa Betancourt    Procedure(s):  CYSTOSCOPY, WITH URETERAL STENT INSERTION    Diagnosis:Ureteral stone [N20.1]  Diagnosis Additional Information: No value filed.    Anesthesia Type:  General    Note:  Anesthesia Post Evaluation    Patient location during evaluation: PACU  Patient participation: Able to fully participate in evaluation  Level of consciousness: awake  Pain management: adequate  Airway patency: patent  Cardiovascular status: acceptable  Respiratory status: acceptable  Hydration status: acceptable  PONV: controlled     Anesthetic complications: None          Last vitals:  Vitals:    03/26/20 1020 03/26/20 1025 03/26/20 1030   BP: 102/57 116/71    Pulse: 74 72    Resp: 15 25 20   Temp:   98.1  F (36.7  C)   SpO2: 98% 98%          Electronically Signed By: Chato Meeks MD  March 26, 2020  10:59 AM

## 2020-03-26 NOTE — ANESTHESIA CARE TRANSFER NOTE
Patient: Lisa Betancourt    Procedure(s):  CYSTOSCOPY, WITH URETERAL STENT INSERTION    Diagnosis: Ureteral stone [N20.1]  Diagnosis Additional Information: No value filed.    Anesthesia Type:   General     Note:    Patient transferred to:PACU  Handoff Report: Identifed the Patient, Identified the Reponsible Provider, Reviewed the pertinent medical history, Discussed the surgical course, Reviewed Intra-OP anesthesia mangement and issues during anesthesia, Set expectations for post-procedure period and Allowed opportunity for questions and acknowledgement of understanding      Vitals: (Last set prior to Anesthesia Care Transfer)    CRNA VITALS  3/26/2020 0927 - 3/26/2020 1005      3/26/2020             Pulse:  93    SpO2:  98 %    Resp Rate (observed):  (!) 2                Electronically Signed By: SKYE Peraza CRNA  March 26, 2020  10:05 AM

## 2020-03-26 NOTE — PHARMACY-ADMISSION MEDICATION HISTORY
Admission medication history interview status for this patient is complete. See [facility] admission navigator for allergy information, prior to admission medications and immunization status.     Medication history interview source(s):Patient  Medication history resources (including written lists, pill bottles, clinic record): Sure scripts  Primary pharmacy: [facility]    Changes made to PTA medication list:  Added: see list  Deleted: Tylenol #3  Changed:  none    Actions taken by pharmacist (provider contacted, etc):None     Additional medication history information:None    Medication reconciliation/reorder completed by provider prior to medication history?  Yes      Prior to Admission medications    Medication Sig Last Dose Taking? Auth Provider   amLODIPine (NORVASC) 10 MG tablet Take 10 mg by mouth daily 3/25/2020 at Unknown time Yes Unknown, Entered By History   biotin 1000 MCG TABS tablet Take 1,000 mcg by mouth daily 3/25/2020 at Unknown time Yes Unknown, Entered By History   calcium citrate (CITRACAL) 950 MG tablet Take 2 tablets by mouth 2 times daily 3/25/2020 at Unknown time Yes Unknown, Entered By History   cetirizine (ZYRTEC) 10 MG tablet Take 10 mg by mouth daily 3/25/2020 at Unknown time Yes Unknown, Entered By History   cyanocobalamin (VITAMIN B-12) 1000 MCG tablet Take 1,000 mcg by mouth once a week 3/21/2020 Yes Unknown, Entered By History   folic acid (FOLVITE) 1 MG tablet Take 1 mg by mouth daily 3/25/2020 at Unknown time Yes Unknown, Entered By History   Levothyroxine Sodium (SYNTHROID PO) Take 175 mcg by mouth daily  3/25/2020 at Unknown time Yes Reported, Patient   methotrexate 2.5 MG tablet Take 12.5 mg by mouth once a week 3/21/2020 Yes Unknown, Entered By History   multivitamin w/minerals (MULTI-VITAMIN) tablet Take 1 tablet by mouth daily 3/25/2020 at Unknown time Yes Unknown, Entered By History   Vitamin D, Cholecalciferol, 25 MCG (1000 UT) TABS Take 2,000 Units by mouth daily 3/25/2020 at  Unknown time Yes Unknown, Entered By History   acetaminophen (TYLENOL) 500 MG tablet Take 500 mg by mouth as needed for other (migraine) At the onset of migraine with Rizatriptan More than a month at Unknown time  Unknown, Entered By History   adalimumab (HUMIRA) 40 MG/0.8ML syr kit Inject 40 mg Subcutaneous every 14 days 3/22/2020  Reported, Patient   fluticasone (FLONASE) 50 MCG/ACT nasal spray Spray 2 sprays into both nostrils daily as needed for rhinitis or allergies More than a month at Unknown time  Unknown, Entered By History   furosemide (LASIX) 10 mg TABS half-tab Take 10 mg by mouth daily as needed (swelling in feet) More than a month at Unknown time  Unknown, Entered By History   rizatriptan (MAXALT) 10 MG tablet Take 10 mg by mouth at onset of headache for migraine Repeat Q2H PRN x 2 more doses. Max 30 mg in 24 hours More than a month at Unknown time  Unknown, Entered By History       Tamara Colbert, PharmD

## 2020-03-26 NOTE — PLAN OF CARE
Vital Signs: VSS  Pain/Comfort: rating pain 3-5/10. Given dilaudid x1 since arriving to floor.   Assessment: WDL except left flank pain noted  Diet: NPO  Output: voiding  Activity/Ambulation: Up SBA  Plan: Will continue to monitor and treat pain. Plan to have urology consult this morning for possible stent placement per patient report. Will continue to IVF while NPO and IV abx. Will continue to monitor and provide supportive therapies as needed

## 2020-03-26 NOTE — H&P
[facility]    History and Physical - Hospitalist Service       Date of Admission:  3/26/2020     Assessment & Plan   Lisa Betancourt is a 49 year old female with a history of rheumatoid arthritis on Humira and methotrexate, history of pyelonephritis, and recent gastric sleeve procedure who is admitted to the hospitalist service with left UPJ stone and associated infection.    Left UPJ stone  Sepsis secondary to UTI  - Urinalysis consistent with infection with >182 WBCs.  Had some frequency prior to admission.  CT of the abdomen/pelvis showed left obstructing 5 mm UPJ stone with associated mild hydronephrosis. Non-obstructing stones were seen in L and R kidneys.  - Meets sepsis criteria with tachycardia and WBC count of 18.  - Urology was contacted in the ED with plans to see the patient in the a.m.  - Patient received ceftriaxone in the ED.  Urine and blood cultures are pending.  - Continue with Toradol and Dilaudid as needed for pain control.  - Her history of gastric sleeve procedure predisposes her to calcium oxalate stone formation.  Defer preventative management of further nephrolithiasis to neurology.    Chronic medical conditions  Rheumatoid arthritis: Hold Humira and methotrexate.  GERD: start medication once med rec complete  Hypothyroid: start synthroid once med rec complete    Diet: NPO  DVT Prophylaxis: Low Risk/Ambulatory with no VTE prophylaxis indicated  Adame Catheter: No  Code Status: Full Code    Disposition Plan   Expected discharge:   Anticipate she will stay in the hospital for monitoring after urologic procedure.    Matthew Guillermo MD  [facility]    ______________________________________________________________________    Chief Complaint   Flank Pain    History of Present Illness   Lisa Betancourt is a 49 year old female with a history of rheumatoid arthritis on Humira and methotrexate, history of pyelonephritis, recent gastric sleeve procedure, who presented to the  "ED complaining of flank pain.    Patient was feeling well until yesterday, when she developed left-sided abdominal/flank pain.  Pain was initially mild but became progressive and very severe, rated at 10/10.  She had associated chills.  Nothing she could do could make the pain improved.  For this reason, she presented to the ED, where vital signs were stable.  Lab work showed WBC count of 18.2, normal electrolytes and kidney function, and urinalysis consistent with infection.  CT of the abdomen/pelvis showed 5 mm left UPJ kidney stone causing obstruction and mild hydronephrosis.  Patient was given ceftriaxone and urology was contacted with plans to see the patient in the morning.    On interview, patient is feeling much better after administration of Toradol and Dilaudid in the ED.  States that prior to presentation she had some urinary frequency but no urgency or dysuria.  Of note, patient had gastric sleeve procedure November 2019. 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    Review of Systems    The 10 point Review of Systems is negative other than noted in the HPI or here.     Physical Exam   /72   Pulse 74   Temp 99.6  F (37.6  C) (Oral)   Resp 18   Ht 1.676 m (5' 6\")   Wt 91.2 kg (201 lb)   SpO2 96%   BMI 32.44 kg/m         General: Looks well, no distress.    HEENT: No scleral icterus. Oropharynx moist.     Neck: Supple.    Pulmonary: Normal work of breathing. Clear to auscultation bilaterally.    Cardiovascular: Regular rate and rhythm without murmur or extra heart sounds.    Abdomen: Soft.  Mild left-sided abdominal tenderness to palpation.    Extremities: No peripheral edema. No clubbing or cyanosis.     Neurologic: Awake, alert, appropriate.    Skin: Warm and dry.    Psychiatric: Normal affect and mood.     Data   Data reviewed today: I have reviewed all labs and imaging results.    Recent Labs   Lab 03/26/20  0051   WBC 18.2*   HGB 14.2   MCV 96 "         POTASSIUM 3.8   CHLORIDE 106   CO2 23   BUN 22   CR 1.07*   ANIONGAP 7   MYRA 8.9   *     Recent Results (from the past 24 hour(s))   Abd/pelvis CT no contrast - Stone Protocol    Narrative    EXAM: CT ABDOMEN PELVIS W/O CONTRAST  LOCATION: [facility]  DATE/TIME: 3/26/2020 12:59 AM    INDICATION: Left flank pain  COMPARISON: 09/14/2015  TECHNIQUE: CT scan of the abdomen and pelvis was performed without IV contrast. Multiplanar reformats were obtained. Dose reduction techniques were used.  CONTRAST: None.    FINDINGS:   LOWER CHEST: Mild atelectasis medial right lung base bordering thoracic spine.    HEPATOBILIARY: Normal.    PANCREAS: Normal.    SPLEEN: Normal.    ADRENAL GLANDS: Normal.    KIDNEYS/BLADDER: Mild hydronephrosis, with asymmetric left perinephric edema and mild left renal enlargement which is likely related to obstruction. A 5 mm stone is present at the UPJ, causing obstruction. Multiple punctate stones are seen in the left   lower kidney. Single punctate stone in the right lower kidney. No right hydronephrosis. Bladder is unremarkable.    BOWEL: Normal caliber bowel. The appendix is normal. No inflammatory process. No free fluid or pneumoperitoneum. Postoperative change from sleeve partial gastrectomy.    LYMPH NODES: Normal.    VASCULATURE: Unremarkable.    PELVIC ORGANS: Normal-sized uterus and ovaries. Trace free fluid is likely physiologic.    MUSCULOSKELETAL: Tiny umbilical fat-containing hernia. Degenerative change in the lumbar spine at L5-S1. No significant osseous abnormality.      Impression    IMPRESSION:   1.  There is 5 mm left UPJ stone causing mild hydronephrosis, with mild left renal enlargement and perinephric edema, likely related to obstruction. Additional punctate stones are seen in the left lower kidney and single punctate stone in the right lower   kidney. No right hydronephrosis.    2.  Status post partial/sleeve gastrectomy. No acute  findings of the bowel.         Past Medical History    I have reviewed this patient's medical history and updated it with pertinent information if needed.   Past Medical History:   Diagnosis Date     Hypothyroid      Rheumatoid arthritis(714.0) (H)         Past Surgical History    I have reviewed this patient's surgical history and updated it with pertinent information if needed.  Past Surgical History:   Procedure Laterality Date     BREAST LUMPECTOMY, RT/LT      Breast Lumpectomy LT     C/SECTION, LOW TRANSVERSE      , Low Transverse     HERNIA REPAIR, UMBILICAL  2002     TUBAL LIGATION          Social History    I have reviewed this patient's social history and updated it with pertinent information if needed.  Social History     Tobacco Use     Smoking status: Never Smoker   Substance Use Topics     Alcohol use: Not on file     Drug use: Not on file          Family History    I have reviewed this patient's family history and updated it with pertinent information if needed.   No family history on file.   Not pertinent.    Prior to Admission Medications    Prior to Admission Medications   Prescriptions Last Dose Informant Patient Reported? Taking?   Levothyroxine Sodium (SYNTHROID PO)   Yes No   Sig: Take 175 mcg by mouth   acetaminophen-codeine (TYLENOL #3) 300-30 MG per tablet   No No   Sig: Take 1-2 tablets by mouth every 4 hours as needed for moderate pain   adalimumab (HUMIRA) 40 MG/0.8ML syr kit   Yes No   Sig: Inject 40 mg Subcutaneous every 14 days      Facility-Administered Medications: None        Allergies    No Known Allergies

## 2020-03-26 NOTE — ED NOTES
"[facility]  ED Nurse Handoff Report    Lisa Betancourt is a 49 year old female   ED Chief complaint: Flank Pain  . ED Diagnosis:   Final diagnoses:   Kidney stone on left side   Bacterial sepsis (H)     Allergies: No Known Allergies    Code Status: Full Code  Activity level - Baseline/Home:  Independent. Activity Level - Current:   Stand by Assist. Lift room needed: No. Bariatric: No   Needed: No   Isolation: No. Infection: Not Applicable.     Vital Signs:   Vitals:    03/26/20 0013 03/26/20 0055 03/26/20 0115   BP: (!) 159/97  131/76   Pulse: 105  76   Resp: 18     Temp: 99.6  F (37.6  C)     TempSrc: Oral     SpO2: 98% 96% 94%   Weight: 91.2 kg (201 lb)     Height: 1.676 m (5' 6\")         Cardiac Rhythm:  ,      Pain level: 0-10 Pain Scale: 6  Patient confused: No. Patient Falls Risk: Yes.   Elimination Status: Has voided   Patient Report - Initial Complaint: Flank pain. Focused Assessment: Pt has hx of RA and kidney stone in 2001. Presents with c/o left \"stabbing\" flank pain that started yesterday with increased urinary frequency. Today pain started radiating to left side of abdomen unrelieved with home Tylenol. Pt reports intermittent nausea, no vomiting. ABCs intact, A&Ox4.   Tests Performed:   Labs Ordered and Resulted from Time of ED Arrival Up to the Time of Departure from the ED   CBC WITH PLATELETS DIFFERENTIAL - Abnormal; Notable for the following components:       Result Value    WBC 18.2 (*)     Absolute Neutrophil 13.9 (*)     Absolute Monocytes 1.7 (*)     All other components within normal limits   BASIC METABOLIC PANEL - Abnormal; Notable for the following components:    Glucose 104 (*)     Creatinine 1.07 (*)     All other components within normal limits   ROUTINE UA WITH MICROSCOPIC REFLEX TO CULTURE - Abnormal; Notable for the following components:    Ketones Urine 20 (*)     Blood Urine Moderate (*)     Protein Albumin Urine 10 (*)     Leukocyte Esterase Urine Large (*)     " WBC Urine >182 (*)     RBC Urine 9 (*)     WBC Clumps Present (*)     Bacteria Urine Few (*)     Squamous Epithelial /HPF Urine 2 (*)     All other components within normal limits   LACTIC ACID WHOLE BLOOD   PERIPHERAL IV CATHETER   URINE CULTURE AEROBIC BACTERIAL   BLOOD CULTURE   BLOOD CULTURE     Abd/pelvis CT no contrast - Stone Protocol   Final Result   IMPRESSION:    1.  There is 5 mm left UPJ stone causing mild hydronephrosis, with mild left renal enlargement and perinephric edema, likely related to obstruction. Additional punctate stones are seen in the left lower kidney and single punctate stone in the right lower    kidney. No right hydronephrosis.      2.  Status post partial/sleeve gastrectomy. No acute findings of the bowel.           Treatments provided: IV NS, IV toradol, IV dilaudid (see MAR). Urine straining.  Family Comments: NA  OBS brochure/video discussed/provided to patient:  Yes  ED Medications:   Medications   HYDROmorphone (PF) (DILAUDID) injection 0.5 mg (0.5 mg Intravenous Given 3/26/20 0045)   cefTRIAXone (ROCEPHIN) 2 g vial to attach to  ml bag for ADULTS or NS 50 ml bag for PEDS (2 g Intravenous New Bag 3/26/20 0140)   0.9% sodium chloride BOLUS (1,000 mLs Intravenous New Bag 3/26/20 0050)   ketorolac (TORADOL) injection 15 mg (15 mg Intravenous Given 3/26/20 0047)   ondansetron (ZOFRAN) injection 4 mg (4 mg Intravenous Given 3/26/20 0047)     Drips infusing:  No  For the majority of the shift, the patient's behavior Green. Interventions performed were none.    Sepsis treatment initiated: No       ED Nurse Name/Phone Number: Nelida Hitchcock RN,   1:56 AM    RECEIVING UNIT ED HANDOFF REVIEW    Above ED Nurse Handoff Report was reviewed: Yes  Reviewed by: Ingrid Daniel RN on March 26, 2020 at 2:26 AM

## 2020-03-26 NOTE — OP NOTE
Procedure Date: 03/26/2020      PREOPERATIVE DIAGNOSIS:  Febrile urinary tract infection (UTI), obstructing left proximal ureteral calculus.        POSTOPERATIVE DIAGNOSIS:   Left pyonephrosis.      SURGEON:  Torres Ware MD      ANESTHESIA:  General laryngeal mask.      ESTIMATED BLOOD LOSS:  0 mL.      PROCEDURES:  Video cystoscopy, left double-J stent placement (5-Panamanian x 24 cm).      INDICATIONS:  The patient is a 49-year-old female who is on immunosuppressives for rheumatoid arthritis and was brought into the Emergency Room and admitted last night with left flank pain.  On CTA there was stone obstructing the proximal left ureter.  She was noted to have low-grade fever, an elevated white count and a dirty urinalysis.  This morning at 8:00, she spiked to 102.2 degrees and was brought directly to Surgery for stent placement.  The procedure, the alternatives, risks and follow-up were carefully discussed.      PROCEDURE:  The patient had received Rocephin 1 gram on the floor earlier this morning.  She was also given 2 grams of IV Ancef preop, taken to cystoscopy and placed supine on the operating table.  After adequate general laryngeal mask anesthesia, the patient was placed in lithotomy position, and her genitalia were prepped and draped in a sterile fashion.  A #22 Panamanian Storz cystoscope with obturator was gently introduced in the bladder, and the urine was slightly cloudy.  Using the 30-degree lens and video and water as an irrigant, the urethra and bladder were inspected revealing inflamed mucosa and no stones or tumors.  The left ureteral orifice was identified, and I passed a Glidewire easily up under fluoroscopy until it curled in the region of the left renal pelvis.  Then I passed a 5-Panamanian x 24 cm Polaris stent over the Glidewire into good position, and there was purulent discharge from the left kidney.  The guidewire was removed, and the stent curled nicely in the region of the left renal pelvis and  in the bladder.  The cystoscope was removed, and a 16-Kazakh Adame was placed in the bladder and placed to closed gravity drainage.  The patient was taken to the recovery room, will be observed there and returned either regular floor or the ICU.         JAIRO YUNG JR, MD             D: 2020   T: 2020   MT:       Name:     PATRICK BHAGAT   MRN:      [id number]        Account:        [id number]   :      1970           Procedure Date: 2020      Document: [id number]       cc: Primary Care Physician        Jairo Yung Jr, MD

## 2020-03-26 NOTE — PLAN OF CARE
Pain decreased since drain put in.  Lisa does now say she is feeling bladder pressure after cath placement.  Urine cloudy looking .

## 2020-03-26 NOTE — PROGRESS NOTES
Progress note update    Patient admitted earlier this morning for left ureteral calculus with associated UTI.  Urology did see the patient and she underwent left double-J stent placement.  There was purulent drainage from the left kidney consistent with pyelonephritis per operative report.    The patient was seen after her procedure.  She is feeling well other than having some discomfort from her Adame catheter.  She is wondering when this can be removed.  I have asked nursing staff to reach out to urology to determine if Adame catheter can be removed.    She will remain on IV ceftriaxone, await urine culture results to determine oral antibiotic.    Dania Agudelo MD

## 2020-03-26 NOTE — BRIEF OP NOTE
Diagnosis: Febrile UTI, obstructing left ureteral stone  Postoperative diagnosis: Left pyonephrosis  Procedure: Video cystoscopy, left double-J stent placement (5 Hebrew by 24 cm)  Surgeon: Chaz  Anesthesia: General laryngeal mask  Estimated blood loss: 0 ml  Findings: Purulent discharge from stent

## 2020-03-26 NOTE — ANESTHESIA PREPROCEDURE EVALUATION
Anesthesia Pre-Procedure Evaluation    Patient: Lisa Betancourt   MRN: [id number] : 1970          Preoperative Diagnosis: Ureteral stone [N20.1]    Procedure(s):  CYSTOSCOPY, WITH URETERAL STENT INSERTION    Past Medical History:   Diagnosis Date     Hypothyroid      Rheumatoid arthritis(714.0)      Past Surgical History:   Procedure Laterality Date     BREAST LUMPECTOMY, RT/LT      Breast Lumpectomy LT     C/SECTION, LOW TRANSVERSE      , Low Transverse     HERNIA REPAIR, UMBILICAL  2002     TUBAL LIGATION       Anesthesia Evaluation     . Pt has had prior anesthetic. Type: General           ROS/MED HX    ENT/Pulmonary:  - neg pulmonary ROS    (-) tobacco use, asthma, COPD, TRICE risk factors and recent URI   Neurologic:  - neg neurologic ROS    (-) seizures and CVA   Cardiovascular:  - neg cardiovascular ROS      (-) hypertension, CAD, arrhythmias and valvular problems/murmurs   METS/Exercise Tolerance:     Hematologic: Comments: Lab Test        20                       0051          WBC          18.2*         HGB          14.2          MCV          96            PLT          263            Lab Test        20                       0051          NA           136           POTASSIUM    3.8           CHLORIDE     106           CO2          23            BUN          22            CR           1.07*         ANIONGAP     7             MYRA          8.9           GLC          104*         - neg hematologic  ROS       Musculoskeletal:   (+) arthritis,  other musculoskeletal- rheumatoid      GI/Hepatic:     (+) GERD Asymptomatic on medication,       Renal/Genitourinary:  - ROS Renal section negative       Endo:     (+) thyroid problem hypothyroidism, Obesity, .   (-) Type I DM, Type II DM and chronic steroid usage   Psychiatric:  - neg psychiatric ROS       Infectious Disease:  - neg infectious disease ROS (+) Other Infectious Disease sepsis      Malignancy:      - no malignancy   Other:    - neg  "other ROS                      Physical Exam  Normal systems: cardiovascular and pulmonary    Airway   Mallampati: II  TM distance: >3 FB  Neck ROM: full    Dental     Cardiovascular       Pulmonary             Lab Results   Component Value Date    WBC 18.2 (H) 03/26/2020    HGB 14.2 03/26/2020    HCT 42.6 03/26/2020     03/26/2020     03/26/2020    POTASSIUM 3.8 03/26/2020    CHLORIDE 106 03/26/2020    CO2 23 03/26/2020    BUN 22 03/26/2020    CR 1.07 (H) 03/26/2020     (H) 03/26/2020    MYRA 8.9 03/26/2020       Preop Vitals  BP Readings from Last 3 Encounters:   03/26/20 121/67   09/12/15 122/88    Pulse Readings from Last 3 Encounters:   03/26/20 64   09/12/15 84      Resp Readings from Last 3 Encounters:   03/26/20 18   09/12/15 12    SpO2 Readings from Last 3 Encounters:   03/26/20 98%   09/12/15 97%      Temp Readings from Last 1 Encounters:   03/26/20 102.2  F (39  C) (Oral)    Ht Readings from Last 1 Encounters:   03/26/20 1.676 m (5' 5.98\")      Wt Readings from Last 1 Encounters:   03/26/20 91.2 kg (201 lb)    Estimated body mass index is 32.46 kg/m  as calculated from the following:    Height as of this encounter: 1.676 m (5' 5.98\").    Weight as of this encounter: 91.2 kg (201 lb).       Anesthesia Plan      History & Physical Review  History and physical reviewed and following examination; no interval change.    ASA Status:  3 .    NPO Status:  > 8 hours    Plan for General with Intravenous and Propofol induction. Maintenance will be Balanced.    PONV prophylaxis:  Ondansetron (or other 5HT-3) and Dexamethasone or Solumedrol         Postoperative Care  Postoperative pain management:  IV analgesics.      Consents  Anesthetic plan, risks, benefits and alternatives discussed with:  Patient.  Use of blood products discussed: Yes.   .                 Chato Meeks MD                    .  "

## 2020-03-26 NOTE — CONSULTS
The patient is a 49-year-old female with no prior history of stones other than the knowledge she had a kidney stone on the left side.  Her mother may have had stones in the past.  She was admitted through the emergency room last night with an infected looking urinalysis and low-grade fever and stable vital signs.  This morning her temperature spiked to 102.2 and she is now being brought to the operating room for left double-J stent placement  Other past medical history: Rheumatoid arthritis, hypothyroidism, GERD  Medications and allergies reviewed  Exam: The patient is alert and oriented but febrile.  Labs: Slightly elevated creatinine, normal serum calcium level, elevated white count  Urine culture pending.  CT scan tiny bilateral renal stones, stone at left proximal ureter or UPJ  Assessment: Febrile UTI, proximal left ureteral calculus  Plan: Patient has been n.p.o.  Discussed cystoscopy with double-J stent placement today followed by elective stone extraction after her urine is sterile and coronavirus incidence is decreasing

## 2020-03-26 NOTE — ED TRIAGE NOTES
Here for left flank pain and left abdominal pain. Took tylenol at 9pm with no help. History of kidney stone in 2011. ABCs intact.

## 2020-03-27 LAB
ANION GAP SERPL CALCULATED.3IONS-SCNC: 3 MMOL/L (ref 3–14)
BACTERIA SPEC CULT: ABNORMAL
BACTERIA SPEC CULT: ABNORMAL
BASOPHILS # BLD AUTO: 0 10E9/L (ref 0–0.2)
BASOPHILS NFR BLD AUTO: 0.2 %
BUN SERPL-MCNC: 16 MG/DL (ref 7–30)
CALCIUM SERPL-MCNC: 8.4 MG/DL (ref 8.5–10.1)
CHLORIDE SERPL-SCNC: 113 MMOL/L (ref 94–109)
CO2 SERPL-SCNC: 24 MMOL/L (ref 20–32)
CREAT SERPL-MCNC: 1 MG/DL (ref 0.52–1.04)
DIFFERENTIAL METHOD BLD: ABNORMAL
EOSINOPHIL # BLD AUTO: 0.1 10E9/L (ref 0–0.7)
EOSINOPHIL NFR BLD AUTO: 0.8 %
ERYTHROCYTE [DISTWIDTH] IN BLOOD BY AUTOMATED COUNT: 14.6 % (ref 10–15)
GFR SERPL CREATININE-BSD FRML MDRD: 67 ML/MIN/{1.73_M2}
GLUCOSE SERPL-MCNC: 99 MG/DL (ref 70–99)
HCT VFR BLD AUTO: 36.4 % (ref 35–47)
HGB BLD-MCNC: 11.7 G/DL (ref 11.7–15.7)
IMM GRANULOCYTES # BLD: 0.1 10E9/L (ref 0–0.4)
IMM GRANULOCYTES NFR BLD: 0.4 %
LYMPHOCYTES # BLD AUTO: 2.3 10E9/L (ref 0.8–5.3)
LYMPHOCYTES NFR BLD AUTO: 16.5 %
Lab: ABNORMAL
MCH RBC QN AUTO: 31.5 PG (ref 26.5–33)
MCHC RBC AUTO-ENTMCNC: 32.1 G/DL (ref 31.5–36.5)
MCV RBC AUTO: 98 FL (ref 78–100)
MONOCYTES # BLD AUTO: 1.4 10E9/L (ref 0–1.3)
MONOCYTES NFR BLD AUTO: 10 %
NEUTROPHILS # BLD AUTO: 10.2 10E9/L (ref 1.6–8.3)
NEUTROPHILS NFR BLD AUTO: 72.1 %
NRBC # BLD AUTO: 0 10*3/UL
NRBC BLD AUTO-RTO: 0 /100
PLATELET # BLD AUTO: 201 10E9/L (ref 150–450)
POTASSIUM SERPL-SCNC: 4.5 MMOL/L (ref 3.4–5.3)
RBC # BLD AUTO: 3.71 10E12/L (ref 3.8–5.2)
SODIUM SERPL-SCNC: 140 MMOL/L (ref 133–144)
SPECIMEN SOURCE: ABNORMAL
WBC # BLD AUTO: 14.2 10E9/L (ref 4–11)

## 2020-03-27 PROCEDURE — 12000013 ZZH R&B PEDS

## 2020-03-27 PROCEDURE — 25800030 ZZH RX IP 258 OP 636: Performed by: INTERNAL MEDICINE

## 2020-03-27 PROCEDURE — 36415 COLL VENOUS BLD VENIPUNCTURE: CPT | Performed by: INTERNAL MEDICINE

## 2020-03-27 PROCEDURE — 25000128 H RX IP 250 OP 636: Performed by: UROLOGY

## 2020-03-27 PROCEDURE — 80048 BASIC METABOLIC PNL TOTAL CA: CPT | Performed by: INTERNAL MEDICINE

## 2020-03-27 PROCEDURE — 99207 ZZC CDG-MDM COMPONENT: MEETS LOW - DOWN CODED: CPT | Performed by: INTERNAL MEDICINE

## 2020-03-27 PROCEDURE — 25000125 ZZHC RX 250: Performed by: INTERNAL MEDICINE

## 2020-03-27 PROCEDURE — 25000132 ZZH RX MED GY IP 250 OP 250 PS 637: Performed by: UROLOGY

## 2020-03-27 PROCEDURE — 85025 COMPLETE CBC W/AUTO DIFF WBC: CPT | Performed by: INTERNAL MEDICINE

## 2020-03-27 PROCEDURE — 25000132 ZZH RX MED GY IP 250 OP 250 PS 637: Performed by: INTERNAL MEDICINE

## 2020-03-27 PROCEDURE — 99232 SBSQ HOSP IP/OBS MODERATE 35: CPT | Performed by: INTERNAL MEDICINE

## 2020-03-27 RX ORDER — AMLODIPINE BESYLATE 5 MG/1
10 TABLET ORAL DAILY
Status: DISCONTINUED | OUTPATIENT
Start: 2020-03-27 | End: 2020-03-27

## 2020-03-27 RX ORDER — ATROPA BELLADONNA AND OPIUM 16.2; 3 MG/1; MG/1
15 SUPPOSITORY RECTAL EVERY 8 HOURS PRN
Status: DISCONTINUED | OUTPATIENT
Start: 2020-03-27 | End: 2020-03-28 | Stop reason: HOSPADM

## 2020-03-27 RX ORDER — CALCIUM CARBONATE 500 MG/1
1000 TABLET, CHEWABLE ORAL EVERY 4 HOURS PRN
Status: DISCONTINUED | OUTPATIENT
Start: 2020-03-27 | End: 2020-03-28 | Stop reason: HOSPADM

## 2020-03-27 RX ADMIN — OXYCODONE HYDROCHLORIDE 5 MG: 5 TABLET ORAL at 15:15

## 2020-03-27 RX ADMIN — ACETAMINOPHEN 650 MG: 325 TABLET, FILM COATED ORAL at 06:25

## 2020-03-27 RX ADMIN — OXYCODONE HYDROCHLORIDE 5 MG: 5 TABLET ORAL at 00:26

## 2020-03-27 RX ADMIN — CEFTRIAXONE 1 G: 1 INJECTION, POWDER, FOR SOLUTION INTRAMUSCULAR; INTRAVENOUS at 01:14

## 2020-03-27 RX ADMIN — ACETAMINOPHEN 650 MG: 325 TABLET, FILM COATED ORAL at 00:26

## 2020-03-27 RX ADMIN — ATROPA BELLADONNA AND OPIUM 0.5 SUPPOSITORY: 16.2; 3 SUPPOSITORY RECTAL at 01:15

## 2020-03-27 RX ADMIN — CALCIUM CARBONATE (ANTACID) CHEW TAB 500 MG 1000 MG: 500 CHEW TAB at 19:46

## 2020-03-27 RX ADMIN — OXYCODONE HYDROCHLORIDE 5 MG: 5 TABLET ORAL at 18:55

## 2020-03-27 RX ADMIN — SODIUM CHLORIDE: 9 INJECTION, SOLUTION INTRAVENOUS at 01:14

## 2020-03-27 RX ADMIN — LEVOTHYROXINE SODIUM 175 MCG: 125 TABLET ORAL at 09:47

## 2020-03-27 RX ADMIN — ACETAMINOPHEN 650 MG: 325 TABLET, FILM COATED ORAL at 15:15

## 2020-03-27 RX ADMIN — CALCIUM CARBONATE (ANTACID) CHEW TAB 500 MG 1000 MG: 500 CHEW TAB at 11:09

## 2020-03-27 RX ADMIN — OXYCODONE HYDROCHLORIDE 5 MG: 5 TABLET ORAL at 06:25

## 2020-03-27 NOTE — PROGRESS NOTES
[facility] Urology Progress Note          Assessment and Plan:   Principal Problem:    Left ureteral calculus, febrile UTI    Assessment: POD 1 cysto, left ureteral stent    Plan:  Await final C&S prior to transitioning to PO course (rec 14 days). Will need definitive stone management in the coming weeks (pending what is allowed in the OR during the COVID-19 pandemic).    Discontinued Adame at bedside at 1245pm.    Will sign off. Please call if questions.     Sharon Nelson PA-C  [facility] Urology  [phone]               Interval History:   doing well; Tc 99.1. Adame clear. [facility].              Review of Systems:   The 5 point Review of Systems is negative other than noted in the HPI             Medications:     Current Facility-Administered Medications Ordered in Epic   Medication Dose Route Frequency Last Rate Last Dose     acetaminophen (TYLENOL) tablet 650 mg  650 mg Oral Q4H PRN   650 mg at 03/27/20 0625     calcium carbonate (TUMS) chewable tablet 1,000 mg  1,000 mg Oral Q4H PRN   1,000 mg at 03/27/20 1109     ceFAZolin (ANCEF) intermittent infusion 1 g  1 g Intravenous See Admin Instructions         cefTRIAXone (ROCEPHIN) 1 g vial to attach to  mL bag for ADULTS or NS 50 mL bag for PEDS  1 g Intravenous Q24H 200 mL/hr at 03/27/20 0114 1 g at 03/27/20 0114     HYDROmorphone (PF) (DILAUDID) injection 0.3-0.5 mg  0.3-0.5 mg Intravenous Q2H PRN   0.5 mg at 03/26/20 2129     ketorolac (TORADOL) injection 15 mg  15 mg Intravenous Q6H PRN   15 mg at 03/26/20 1501     levothyroxine (SYNTHROID/LEVOTHROID) tablet 175 mcg  175 mcg Oral Daily   175 mcg at 03/27/20 0947     lidocaine (LMX4) cream   Topical Q1H PRN         lidocaine (LMX4) cream   Topical Q1H PRN         lidocaine 1 % 0.1-1 mL  0.1-1 mL Other Q1H PRN         lidocaine 1 % 0.1-1 mL  0.1-1 mL Other Q1H PRN         melatonin tablet 1 mg  1 mg Oral At Bedtime PRN         naloxone (NARCAN) injection 0.1-0.4 mg  0.1-0.4 mg  Intravenous Q2 Min PRN         ondansetron (ZOFRAN-ODT) ODT tab 4 mg  4 mg Oral Q6H PRN        Or     ondansetron (ZOFRAN) injection 4 mg  4 mg Intravenous Q6H PRN   4 mg at 03/26/20 0724     opium-belladonna (B&O SUPPRETTES) 30-16.2 MG per suppository 0.5 suppository  15 mg Rectal Q8H PRN   0.5 suppository at 03/27/20 0115     oxyCODONE (ROXICODONE) tablet 5 mg  5 mg Oral Q4H PRN   5 mg at 03/27/20 0625     polyethylene glycol (MIRALAX) Packet 17 g  17 g Oral Daily PRN         scopolamine (TRANSDERM) 72 hr patch 1 patch  1 patch Transdermal Q72H   1 patch at 03/26/20 0915    And     scopolamine (TRANSDERM-SCOP) Patch in Place   Transdermal Q8H         sodium chloride (PF) 0.9% PF flush 3 mL  3 mL Intracatheter q1 min prn         sodium chloride (PF) 0.9% PF flush 3 mL  3 mL Intracatheter q1 min prn         sodium chloride (PF) 0.9% PF flush 3 mL  3 mL Intracatheter Q8H         sodium chloride 0.9% infusion   Intravenous Continuous 100 mL/hr at 03/27/20 0952       No current [facility]-ordered outpatient medications on file.                  Physical Exam:   Vitals were reviewed  Patient Vitals for the past 8 hrs:   BP Temp Temp src Pulse Heart Rate Resp SpO2   03/27/20 1109 114/68 99.1  F (37.3  C) Oral -- 60 16 98 %   03/27/20 0925 105/53 99.3  F (37.4  C) Oral -- 64 16 --   03/27/20 0455 111/68 98.1  F (36.7  C) Oral 64 -- 16 95 %     GEN: NAD, lying in bed  EYES: EOMI  MOUTH: MMM  NECK: Supple  RESP: Unlabored breathing  CARDIAC: No LE edema  SKIN: Warm  ABD: soft  NEURO: AAO  : Clear           Data:   No results found for: NTBNPI, NTBNP  Lab Results   Component Value Date    WBC 14.2 (H) 03/27/2020    WBC 18.2 (H) 03/26/2020    HGB 11.7 03/27/2020    HGB 14.2 03/26/2020    HCT 36.4 03/27/2020    HCT 42.6 03/26/2020    MCV 98 03/27/2020    MCV 96 03/26/2020     03/27/2020     03/26/2020     No results found for: INR

## 2020-03-27 NOTE — PLAN OF CARE
A&O x 4. Up with SBA- ambulated in halls. Tylenol controlling pain. IV SL. Taking PO fluids well.  Adame out at 1245- DTV. Straining all urine   Urology following

## 2020-03-27 NOTE — PLAN OF CARE
Pt urine output increased after bolus given, tolerating small amounts of regular diet, analgesics given po and IV in order to control pain, heating pad utilized for lower abdominal cramps, continue to monitor and provide for needs.

## 2020-03-27 NOTE — CONSULTS
Care Transition Initial Assessment - RN      Met with: Patient.  DATA   Principal Problem:    Left ureteral calculus  Active Problems:    Ureterolithiasis       Cognitive Status: awake, alert and oriented.  Primary Care Clinic Name: [facility]  Primary Care MD Name: Dr. Berman  Contact information and PCP information verified: Yes  Lives With: spouse       Insurance concerns: No Insurance issues identified  ASSESSMENT  Patient currently receives the following services:  none        Identified issues/concerns regarding health management:   Left UPJ stone, with left pyelonephritis, pus draining out of the left kidney during cystoscopy, status post left double J sent placed on 3/26/20.  CM met with patient at bedside because chart indicated no PCP.  Patient does have PCP.  Chart updated.  CM also called registration to update patients demographics.   PLAN  Financial costs for the patient were not addressed .  Patient given options and choices for discharge yes .  Patient/family is agreeable to the plan?  Yes:   Patient anticipates discharging to home .        Patient anticipates needs for home equipment: No  Transportation/person available to transport on day of discharge  is .  Plan/Disposition: Home   Appointments: Patient politely declined appointment support.       Care  (CTS) will continue to follow as needed.    Hillary Hopkins RN, BSN, PHN, CTS  Care Coordinator  [facility]  [phone]

## 2020-03-27 NOTE — PROGRESS NOTES
[facility]  Hospitalist Progress Note  Admit 3/26/2020 12:15 AM    Name: Lisa Betancourt    MRN: [id number]  Provider:  Edenilson Ramos MD, [facility]    Date of Service: 03/27/2020     Reason for Stay (Diagnosis): Left UPJ stone complicated by pyelonephritis and sepsis         Summary of hospital stay & Assessment/Plan:   Summary of Stay: Lisa Betancourt is a 49 year old female who was admitted on 3/26/2020  49 year old female with a history of rheumatoid arthritis on Humira and methotrexate, history of pyelonephritis, and recent gastric sleeve procedure who is admitted to the hospitalist service with left UPJ stone and associated infection.    Problem List:   Left UPJ stone, with left pyelonephritis, pus draining out of the left kidney during cystoscopy, status post left double-J stent placement done on 3/26    Continue IV ceftriaxone, IV Dilaudid for pain  Continue IV fluid and monitor kidney function  Follow-up on the culture results, preliminary showing E. coli  Repeat CBC BMP in the morning    Chronic medical conditions  Rheumatoid arthritis: Hold Humira and methotrexate.  Hypertension keep amlodipine on hold blood pressure is soft  Hypothyroid: start synthroid    DVT Prophylaxis: Ambulate every shift  Code Status:  Full Code    Disposition Plan     Expected discharge in 1-2 days to prior living arrangement once susceptibility is back, remains afebrile and cleared by urology.     Entered: Edenilsno Ramos 03/27/2020, 8:59 AM                 Interval History:       Patient reports of bladder spasm otherwise denies other complaints denies fever chills nausea or vomiting.  Today's plan detailed above discussed with nursing               Physical Exam:   Physical Exam   Temp: 98.1  F (36.7  C) Temp src: Oral BP: 111/68 Pulse: 64 Heart Rate: 73 Resp: 16 SpO2: 95 % O2 Device: None (Room air) Oxygen Delivery: 4 LPM  Vitals:    03/26/20 0013 03/26/20 0256   Weight: 91.2 kg (201 lb) 91.2 kg (201 lb)     I/O last 3  completed shifts:  In: 4458 [P.O.:300; I.V.:4158]  Out: 1255 [Urine:1255]      GENERAL:  Comfortable.   PSYCH: pleasant, oriented, No acute distress.  EYES: PERRLA, Normal conjunctiva.  HEART:  Normal S1, S2 with no edema.  LUNGS:  Clear to auscultation, normal Respiratory effort.  ABDOMEN:  Soft, no hepatosplenomegaly, normal bowel sounds.  SKIN:  Dry to touch, No rash.      Medications     sodium chloride 100 mL/hr at 03/27/20 0114       ceFAZolin  1 g Intravenous See Admin Instructions     cefTRIAXone  1 g Intravenous Q24H     levothyroxine  175 mcg Oral Daily     scopolamine  1 patch Transdermal Q72H    And     scopolamine   Transdermal Q8H     sodium chloride (PF)  3 mL Intracatheter Q8H     Data     -Data reviewed today:  I personally reviewed  all new labs and imaging results over the last 24 hours.    Recent Labs   Lab 03/27/20  0620 03/26/20  0051   WBC 14.2* 18.2*   HGB 11.7 14.2   HCT 36.4 42.6   MCV 98 96    263     Recent Labs   Lab 03/27/20  0620 03/26/20  0051    136   POTASSIUM 4.5 3.8   CHLORIDE 113* 106   CO2 24 23   ANIONGAP 3 7   GLC 99 104*   BUN 16 22   CR 1.00 1.07*   GFRESTIMATED 67 62   GFRESTBLACK 78 71   MYRA 8.4* 8.9       Recent Results (from the past 24 hour(s))   XR Surgery JANE L/T 5 Min Fluoro w Stills    Narrative    This exam was marked as non-reportable because it will not be read by a   radiologist or a [place] non-radiologist provider.             This document was produced using voice recognition software

## 2020-03-27 NOTE — PLAN OF CARE
Vital Signs: VSS  Pain/Comfort: Rating pain 6-7/10. O&P suppository given for bladder spasms and one dose of tylenol and oxycodone given.   Assessment:WDL  Diet: Regular, tolerating well  Output: UCI'd. Urine output averaging 45 ml/hr this shift. No BM yet.   Activity/Ambulation: Stood at side of bed. Bladder spasms so laid back down and O&P suppository administered. Has been sleeping since  Plan: Will continue to encourage ambulation. Will continue to monitor and help keep pain controlled. Plan to UCO today. Will continue to monitor and provide supportive therapies as needed

## 2020-03-28 VITALS
SYSTOLIC BLOOD PRESSURE: 122 MMHG | HEIGHT: 66 IN | RESPIRATION RATE: 16 BRPM | TEMPERATURE: 99 F | HEART RATE: 64 BPM | BODY MASS INDEX: 32.3 KG/M2 | DIASTOLIC BLOOD PRESSURE: 69 MMHG | OXYGEN SATURATION: 97 % | WEIGHT: 201 LBS

## 2020-03-28 LAB
ANION GAP SERPL CALCULATED.3IONS-SCNC: 3 MMOL/L (ref 3–14)
BUN SERPL-MCNC: 15 MG/DL (ref 7–30)
CALCIUM SERPL-MCNC: 8.2 MG/DL (ref 8.5–10.1)
CHLORIDE SERPL-SCNC: 113 MMOL/L (ref 94–109)
CO2 SERPL-SCNC: 25 MMOL/L (ref 20–32)
CREAT SERPL-MCNC: 0.96 MG/DL (ref 0.52–1.04)
ERYTHROCYTE [DISTWIDTH] IN BLOOD BY AUTOMATED COUNT: 14.8 % (ref 10–15)
GFR SERPL CREATININE-BSD FRML MDRD: 70 ML/MIN/{1.73_M2}
GLUCOSE SERPL-MCNC: 81 MG/DL (ref 70–99)
HCT VFR BLD AUTO: 34 % (ref 35–47)
HGB BLD-MCNC: 11.1 G/DL (ref 11.7–15.7)
MCH RBC QN AUTO: 31.4 PG (ref 26.5–33)
MCHC RBC AUTO-ENTMCNC: 32.6 G/DL (ref 31.5–36.5)
MCV RBC AUTO: 96 FL (ref 78–100)
PLATELET # BLD AUTO: 200 10E9/L (ref 150–450)
POTASSIUM SERPL-SCNC: 4.3 MMOL/L (ref 3.4–5.3)
RBC # BLD AUTO: 3.53 10E12/L (ref 3.8–5.2)
SODIUM SERPL-SCNC: 141 MMOL/L (ref 133–144)
WBC # BLD AUTO: 10.6 10E9/L (ref 4–11)

## 2020-03-28 PROCEDURE — 25000132 ZZH RX MED GY IP 250 OP 250 PS 637: Performed by: INTERNAL MEDICINE

## 2020-03-28 PROCEDURE — 25000132 ZZH RX MED GY IP 250 OP 250 PS 637: Performed by: UROLOGY

## 2020-03-28 PROCEDURE — 85027 COMPLETE CBC AUTOMATED: CPT | Performed by: INTERNAL MEDICINE

## 2020-03-28 PROCEDURE — 80048 BASIC METABOLIC PNL TOTAL CA: CPT | Performed by: INTERNAL MEDICINE

## 2020-03-28 PROCEDURE — 36415 COLL VENOUS BLD VENIPUNCTURE: CPT | Performed by: INTERNAL MEDICINE

## 2020-03-28 PROCEDURE — 99239 HOSP IP/OBS DSCHRG MGMT >30: CPT | Performed by: INTERNAL MEDICINE

## 2020-03-28 PROCEDURE — 25000128 H RX IP 250 OP 636: Performed by: UROLOGY

## 2020-03-28 RX ORDER — CIPROFLOXACIN 500 MG/1
500 TABLET, FILM COATED ORAL 2 TIMES DAILY
Qty: 28 TABLET | Refills: 0 | Status: SHIPPED | OUTPATIENT
Start: 2020-03-28 | End: 2020-05-06

## 2020-03-28 RX ADMIN — OXYCODONE HYDROCHLORIDE 5 MG: 5 TABLET ORAL at 01:51

## 2020-03-28 RX ADMIN — ACETAMINOPHEN 650 MG: 325 TABLET, FILM COATED ORAL at 01:51

## 2020-03-28 RX ADMIN — CEFTRIAXONE 1 G: 1 INJECTION, POWDER, FOR SOLUTION INTRAMUSCULAR; INTRAVENOUS at 01:49

## 2020-03-28 RX ADMIN — LEVOTHYROXINE SODIUM 175 MCG: 125 TABLET ORAL at 09:37

## 2020-03-28 NOTE — DISCHARGE SUMMARY
[facility]  Discharge Summary  Hospitalist      Date of Admission:  3/26/2020  Date of Discharge:  3/28/2020  Provider:  Edenilson Ramos MD. [facility]  Date of Service (when I last saw the patient): 03/28/20      Primary Provider: Jessica Mcgrath          Discharge Diagnosis:     Discharge Diagnoses   Sepsis secondary to left pyelonephritis in the setting of UPJ stone obstruction  Rheumatoid arthritis on immunosuppression  Hypertension    Other medical issues:  Past Medical History:   Diagnosis Date     Hypothyroid      Rheumatoid arthritis(714.0)          Please see the admission history and physical for full details.     Hospital Course     Lisa Betancourt was admitted on 3/26/2020.  The following problems were addressed during her hospitalization:    49 year old female with a history of rheumatoid arthritis on Humira and methotrexate, history of pyelonephritis, and recent gastric sleeve procedure who is admitted to the hospitalist service with left UPJ stone and associated infection.     Left UPJ stone, with left pyelonephritis, pus draining out of the left kidney during cystoscopy, status post left double-J stent placement done on 3/26.  Postop patient did well, when she was admitted her white count was above 18,000 temperature above 102, fever resolved after stent to low-grade 99, pain significantly improved except with urination she was still having some discomfort.  Her urine culture showing E. coli sensitive.  She wanted to be discharged as soon as possible with concern for the pandemic did not want to stay in the hospital.  She will go home on 2 weeks of ciprofloxacin and was informed to follow-up with urology for stent removal, patient understand that this needs to be scheduled as outpatient by urology however due to pandemic we cannot give her a date from now.  She understand that the stent cannot remain indefinitely.    Pending Results   Unresulted Labs Ordered in the Past 30 Days of this  Admission     Date and Time Order Name Status Description    3/26/2020 0143 Blood culture Preliminary     3/26/2020 0143 Blood culture Preliminary           Discharge Orders      Reason for your hospital stay    Pyelonephritis and kidney stone     Follow-up and recommended labs and tests     Follow-up with primary care in 1 week  Follow-up with urology: In 1 to 2 weeks to schedule an appointment for stent removal     Activity    Your activity upon discharge: activity as tolerated     Diet    Follow this diet upon discharge: Orders Placed This Encounter      Regular Diet Adult       Code Status   Full Code       Primary Care Physician   Jessica Mcgrath    Physical Exam   Temp: 99  F (37.2  C) Temp src: Oral BP: 122/69   Heart Rate: 64 Resp: 16 SpO2: 97 % O2 Device: None (Room air)    Vitals:    03/26/20 0013 03/26/20 0256   Weight: 91.2 kg (201 lb) 91.2 kg (201 lb)     Vital Signs with Ranges  Temp:  [99  F (37.2  C)-99.4  F (37.4  C)] 99  F (37.2  C)  Heart Rate:  [60-75] 64  Resp:  [16] 16  BP: (114-126)/(64-69) 122/69  SpO2:  [95 %-100 %] 97 %  I/O last 3 completed shifts:  In: 1250 [P.O.:1250]  Out: 1175 [Urine:1175]    Constitutional:  alert, cooperative, no apparent distress  Respiratory: No increased work of breathing, good air exchange, no crackles or wheezing.  Cardiovascular: apical impulse,normal S1 and S2  GI: bowel sounds present, soft, non-distended, non-tender      Discharge Disposition   Discharged to home    Consultations This Hospital Stay   UROLOGY IP CONSULT  CARE COORDINATOR IP CONSULT    Time Spent on this Encounter   IEdenilson MD, personally saw the patient today and spent greater than 30 minutes discharging this patient.      Discharge Medications   Current Discharge Medication List      START taking these medications    Details   ciprofloxacin (CIPRO) 500 MG tablet Take 1 tablet (500 mg) by mouth 2 times daily for 14 days  Qty: 28 tablet, Refills: 0    Associated Diagnoses:  Bacterial sepsis (H); Ureterolithiasis         CONTINUE these medications which have NOT CHANGED    Details   amLODIPine (NORVASC) 10 MG tablet Take 10 mg by mouth daily      biotin 1000 MCG TABS tablet Take 1,000 mcg by mouth daily      calcium citrate (CITRACAL) 950 MG tablet Take 2 tablets by mouth 2 times daily      cetirizine (ZYRTEC) 10 MG tablet Take 10 mg by mouth daily      cyanocobalamin (VITAMIN B-12) 1000 MCG tablet Take 1,000 mcg by mouth once a week      folic acid (FOLVITE) 1 MG tablet Take 1 mg by mouth daily      Levothyroxine Sodium (SYNTHROID PO) Take 175 mcg by mouth daily       methotrexate 2.5 MG tablet Take 12.5 mg by mouth once a week      multivitamin w/minerals (MULTI-VITAMIN) tablet Take 1 tablet by mouth daily      Vitamin D, Cholecalciferol, 25 MCG (1000 UT) TABS Take 2,000 Units by mouth daily      acetaminophen (TYLENOL) 500 MG tablet Take 500 mg by mouth as needed for other (migraine) At the onset of migraine with Rizatriptan      adalimumab (HUMIRA) 40 MG/0.8ML syr kit Inject 40 mg Subcutaneous every 14 days      fluticasone (FLONASE) 50 MCG/ACT nasal spray Spray 2 sprays into both nostrils daily as needed for rhinitis or allergies      furosemide (LASIX) 10 mg TABS half-tab Take 10 mg by mouth daily as needed (swelling in feet)      rizatriptan (MAXALT) 10 MG tablet Take 10 mg by mouth at onset of headache for migraine Repeat Q2H PRN x 2 more doses. Max 30 mg in 24 hours           Allergies   No Known Allergies  Data   Most Recent 3 CBC's:  Recent Labs   Lab Test 03/28/20  0629 03/27/20  0620 03/26/20  0051   WBC 10.6 14.2* 18.2*   HGB 11.1* 11.7 14.2   MCV 96 98 96    201 263      Most Recent 3 BMP's:  Recent Labs   Lab Test 03/28/20 0629 03/27/20  0620 03/26/20  0051    140 136   POTASSIUM 4.3 4.5 3.8   CHLORIDE 113* 113* 106   CO2 25 24 23   BUN 15 16 22   CR 0.96 1.00 1.07*   ANIONGAP 3 3 7   MYRA 8.2* 8.4* 8.9   GLC 81 99 104*     Most Recent 2 LFT's:No lab results  found.  Most Recent INR's and Anticoagulation Dosing History:  Anticoagulation Dose History     There is no flowsheet data to display.        Most Recent 3 Troponin's:No lab results found.  Most Recent Cholesterol Panel:No lab results found.  Most Recent 6 Bacteria Isolates From Any Culture (See EPIC Reports for Culture Details):  Recent Labs   Lab Test 03/26/20  0149 03/26/20  0051   CULT No growth after 2 days  No growth after 2 days 10,000 to 50,000 colonies/mL  Escherichia coli  *  <10,000 colonies/mL  mixed urogenital shama       Most Recent TSH, T4 and A1c Labs:No lab results found.  Results for orders placed or performed during the hospital encounter of 03/26/20   Abd/pelvis CT no contrast - Stone Protocol    Narrative    EXAM: CT ABDOMEN PELVIS W/O CONTRAST  LOCATION: [facility]  DATE/TIME: 3/26/2020 12:59 AM    INDICATION: Left flank pain  COMPARISON: 09/14/2015  TECHNIQUE: CT scan of the abdomen and pelvis was performed without IV contrast. Multiplanar reformats were obtained. Dose reduction techniques were used.  CONTRAST: None.    FINDINGS:   LOWER CHEST: Mild atelectasis medial right lung base bordering thoracic spine.    HEPATOBILIARY: Normal.    PANCREAS: Normal.    SPLEEN: Normal.    ADRENAL GLANDS: Normal.    KIDNEYS/BLADDER: Mild hydronephrosis, with asymmetric left perinephric edema and mild left renal enlargement which is likely related to obstruction. A 5 mm stone is present at the UPJ, causing obstruction. Multiple punctate stones are seen in the left   lower kidney. Single punctate stone in the right lower kidney. No right hydronephrosis. Bladder is unremarkable.    BOWEL: Normal caliber bowel. The appendix is normal. No inflammatory process. No free fluid or pneumoperitoneum. Postoperative change from sleeve partial gastrectomy.    LYMPH NODES: Normal.    VASCULATURE: Unremarkable.    PELVIC ORGANS: Normal-sized uterus and ovaries. Trace free fluid is likely  physiologic.    MUSCULOSKELETAL: Tiny umbilical fat-containing hernia. Degenerative change in the lumbar spine at L5-S1. No significant osseous abnormality.      Impression    IMPRESSION:   1.  There is 5 mm left UPJ stone causing mild hydronephrosis, with mild left renal enlargement and perinephric edema, likely related to obstruction. Additional punctate stones are seen in the left lower kidney and single punctate stone in the right lower   kidney. No right hydronephrosis.    2.  Status post partial/sleeve gastrectomy. No acute findings of the bowel.     XR Surgery JANE L/T 5 Min Fluoro w Stills    Narrative    This exam was marked as non-reportable because it will not be read by a   radiologist or a [facility] non-radiologist provider.                   Disclaimer: This note consists of symbols derived from keyboarding, dictation and/or voice recognition software. As a result, there may be errors in the script that have gone undetected. Please consider this when interpreting information found in this chart.

## 2020-03-28 NOTE — PLAN OF CARE
"Tmax 99.4. VSS. Pain in \"bladder\" rated 7/10 controlled with Tylenol and Oxycodone. Heating pad to back. Voiding frequent small amounts of dark jennifer urine with sediment and mucous, strained for no stone. Burning continues with urination. Tolerating regular diet. Receiving Rocephin. Appeared to sleep comfortably between cares.     "

## 2020-03-28 NOTE — PLAN OF CARE
Pt c/o hospital bed being uncomfortable, offered pt to sleep in recliner in room, encouraged her to ambulate in halls, and drink water frequently, continues to c/o burning and frequency of urination, continue to monitor and provide for needs.

## 2020-03-28 NOTE — PLAN OF CARE
VSS.  Denies need for pain medication at this time.  Up independently in room.  Showered.  Minimal appetite.  Discharged to home.  Discharge instructions given.  All questions answered, aware of follow up recommendations.

## 2020-04-01 LAB
BACTERIA SPEC CULT: NO GROWTH
BACTERIA SPEC CULT: NO GROWTH
SPECIMEN SOURCE: NORMAL
SPECIMEN SOURCE: NORMAL

## 2020-05-04 ENCOUNTER — TELEPHONE (OUTPATIENT)
Dept: UROLOGY | Facility: CLINIC | Age: 50
End: 2020-05-04

## 2020-05-04 NOTE — TELEPHONE ENCOUNTER
Patient called nurse line and reports that she is having significant pain from stent. She is currently taking Oxybuinen. She would like to know what other measures she can take to deal with pain. Informed her she may try a heating pad on a low setting. Informed her to keep alternating with tylenol and ibuprofen. Will send message to MD. Shantal Olson LPN

## 2020-05-05 ENCOUNTER — PREP FOR PROCEDURE (OUTPATIENT)
Dept: UROLOGY | Facility: CLINIC | Age: 50
End: 2020-05-05

## 2020-05-05 DIAGNOSIS — N20.0 RENAL CALCULUS, LEFT: Primary | ICD-10-CM

## 2020-05-05 RX ORDER — CEFAZOLIN SODIUM 1 G/50ML
1 INJECTION, SOLUTION INTRAVENOUS SEE ADMIN INSTRUCTIONS
Status: CANCELLED | OUTPATIENT
Start: 2020-05-05

## 2020-05-05 RX ORDER — CEFAZOLIN SODIUM 2 G/50ML
2 SOLUTION INTRAVENOUS
Status: CANCELLED | OUTPATIENT
Start: 2020-05-05

## 2020-05-05 NOTE — TELEPHONE ENCOUNTER
[facility] Call Center    Phone Message    May a detailed message be left on voicemail: yes     Reason for Call: Other: Pt calling in, pt stated she was promised a call back yesterday and never heard back. Lisa would like to speak with a nurse as soon as possible      Action Taken: Message routed to:  [facility] ([facility]): carolyn uro    Travel Screening: Not Applicable

## 2020-05-06 DIAGNOSIS — Z11.59 ENCOUNTER FOR SCREENING FOR OTHER VIRAL DISEASES: Primary | ICD-10-CM

## 2020-05-06 PROBLEM — N20.0 RENAL CALCULUS, LEFT: Status: ACTIVE | Noted: 2020-05-06

## 2020-05-06 RX ORDER — OXYBUTYNIN CHLORIDE 5 MG/1
5 TABLET ORAL 2 TIMES DAILY
Status: ON HOLD | COMMUNITY
End: 2021-01-15

## 2020-05-06 SDOH — HEALTH STABILITY: MENTAL HEALTH: HOW OFTEN DO YOU HAVE A DRINK CONTAINING ALCOHOL?: NEVER

## 2020-05-08 ENCOUNTER — TRANSFERRED RECORDS (OUTPATIENT)
Dept: HEALTH INFORMATION MANAGEMENT | Facility: CLINIC | Age: 50
End: 2020-05-08

## 2020-05-08 LAB
CREAT SERPL-MCNC: 0.95 MG/DL (ref 0.55–1.02)
GFR SERPL CREATININE-BSD FRML MDRD: >60 ML/MIN/1.73M2
GLUCOSE SERPL-MCNC: 85 MG/DL (ref 70–100)
POTASSIUM SERPL-SCNC: 4.5 MMOL/L (ref 3.5–5.1)

## 2020-05-09 ENCOUNTER — OFFICE VISIT (OUTPATIENT)
Dept: URGENT CARE | Facility: URGENT CARE | Age: 50
End: 2020-05-09
Attending: UROLOGY
Payer: OTHER GOVERNMENT

## 2020-05-09 DIAGNOSIS — Z11.59 ENCOUNTER FOR SCREENING FOR OTHER VIRAL DISEASES: ICD-10-CM

## 2020-05-09 PROCEDURE — 99000 SPECIMEN HANDLING OFFICE-LAB: CPT | Performed by: UROLOGY

## 2020-05-09 PROCEDURE — U0003 INFECTIOUS AGENT DETECTION BY NUCLEIC ACID (DNA OR RNA); SEVERE ACUTE RESPIRATORY SYNDROME CORONAVIRUS 2 (SARS-COV-2) (CORONAVIRUS DISEASE [COVID-19]), AMPLIFIED PROBE TECHNIQUE, MAKING USE OF HIGH THROUGHPUT TECHNOLOGIES AS DESCRIBED BY CMS-2020-01-R: HCPCS | Mod: 90 | Performed by: UROLOGY

## 2020-05-09 PROCEDURE — 99207 ZZC NO BILLABLE SERVICE THIS VISIT: CPT

## 2020-05-10 LAB
SARS-COV-2 RNA SPEC QL NAA+PROBE: NOT DETECTED
SPECIMEN SOURCE: NORMAL

## 2020-05-11 ENCOUNTER — TRANSFERRED RECORDS (OUTPATIENT)
Dept: HEALTH INFORMATION MANAGEMENT | Facility: CLINIC | Age: 50
End: 2020-05-11

## 2020-05-11 ASSESSMENT — MIFFLIN-ST. JEOR: SCORE: 1554.07

## 2020-05-12 ENCOUNTER — APPOINTMENT (OUTPATIENT)
Dept: GENERAL RADIOLOGY | Facility: CLINIC | Age: 50
End: 2020-05-12
Attending: UROLOGY
Payer: OTHER GOVERNMENT

## 2020-05-12 ENCOUNTER — HOSPITAL ENCOUNTER (OUTPATIENT)
Facility: CLINIC | Age: 50
Discharge: HOME | End: 2020-05-12
Attending: UROLOGY | Admitting: UROLOGY
Payer: OTHER GOVERNMENT

## 2020-05-12 ENCOUNTER — ANESTHESIA (OUTPATIENT)
Dept: SURGERY | Facility: CLINIC | Age: 50
End: 2020-05-12
Payer: OTHER GOVERNMENT

## 2020-05-12 ENCOUNTER — ANESTHESIA EVENT (OUTPATIENT)
Dept: SURGERY | Facility: CLINIC | Age: 50
End: 2020-05-12
Payer: OTHER GOVERNMENT

## 2020-05-12 VITALS
DIASTOLIC BLOOD PRESSURE: 79 MMHG | HEART RATE: 61 BPM | HEIGHT: 67 IN | SYSTOLIC BLOOD PRESSURE: 118 MMHG | TEMPERATURE: 97 F | OXYGEN SATURATION: 100 % | BODY MASS INDEX: 30.53 KG/M2 | RESPIRATION RATE: 16 BRPM | WEIGHT: 194.5 LBS

## 2020-05-12 DIAGNOSIS — N20.0 RENAL CALCULUS, LEFT: ICD-10-CM

## 2020-05-12 PROCEDURE — 25800030 ZZH RX IP 258 OP 636: Performed by: ANESTHESIOLOGY

## 2020-05-12 PROCEDURE — C1713 ANCHOR/SCREW BN/BN,TIS/BN: HCPCS | Performed by: UROLOGY

## 2020-05-12 PROCEDURE — 36000058 ZZH SURGERY LEVEL 3 EA 15 ADDTL MIN: Performed by: UROLOGY

## 2020-05-12 PROCEDURE — 25800025 ZZH RX 258: Performed by: UROLOGY

## 2020-05-12 PROCEDURE — 40000277 XR SURGERY CARM FLUORO LESS THAN 5 MIN W STILLS: Mod: TC

## 2020-05-12 PROCEDURE — 25000125 ZZHC RX 250: Performed by: ANESTHESIOLOGY

## 2020-05-12 PROCEDURE — 71000027 ZZH RECOVERY PHASE 2 EACH 15 MINS: Performed by: UROLOGY

## 2020-05-12 PROCEDURE — 25000128 H RX IP 250 OP 636: Performed by: NURSE ANESTHETIST, CERTIFIED REGISTERED

## 2020-05-12 PROCEDURE — C1769 GUIDE WIRE: HCPCS | Performed by: UROLOGY

## 2020-05-12 PROCEDURE — 88300 SURGICAL PATH GROSS: CPT | Performed by: UROLOGY

## 2020-05-12 PROCEDURE — 52352 CYSTOURETERO W/STONE REMOVE: CPT | Mod: LT | Performed by: UROLOGY

## 2020-05-12 PROCEDURE — 82365 CALCULUS SPECTROSCOPY: CPT | Performed by: UROLOGY

## 2020-05-12 PROCEDURE — 25000128 H RX IP 250 OP 636: Performed by: UROLOGY

## 2020-05-12 PROCEDURE — 71000012 ZZH RECOVERY PHASE 1 LEVEL 1 FIRST HR: Performed by: UROLOGY

## 2020-05-12 PROCEDURE — 40000306 ZZH STATISTIC PRE PROC ASSESS II: Performed by: UROLOGY

## 2020-05-12 PROCEDURE — 37000008 ZZH ANESTHESIA TECHNICAL FEE, 1ST 30 MIN: Performed by: UROLOGY

## 2020-05-12 PROCEDURE — 36000060 ZZH SURGERY LEVEL 3 W FLUORO 1ST 30 MIN: Performed by: UROLOGY

## 2020-05-12 PROCEDURE — 37000009 ZZH ANESTHESIA TECHNICAL FEE, EACH ADDTL 15 MIN: Performed by: UROLOGY

## 2020-05-12 PROCEDURE — 88300 SURGICAL PATH GROSS: CPT | Mod: 26 | Performed by: UROLOGY

## 2020-05-12 PROCEDURE — 25000125 ZZHC RX 250: Performed by: NURSE ANESTHETIST, CERTIFIED REGISTERED

## 2020-05-12 PROCEDURE — 27210794 ZZH OR GENERAL SUPPLY STERILE: Performed by: UROLOGY

## 2020-05-12 RX ORDER — CEFAZOLIN SODIUM 2 G/100ML
2 INJECTION, SOLUTION INTRAVENOUS
Status: COMPLETED | OUTPATIENT
Start: 2020-05-12 | End: 2020-05-12

## 2020-05-12 RX ORDER — SODIUM CHLORIDE, SODIUM LACTATE, POTASSIUM CHLORIDE, CALCIUM CHLORIDE 600; 310; 30; 20 MG/100ML; MG/100ML; MG/100ML; MG/100ML
INJECTION, SOLUTION INTRAVENOUS CONTINUOUS
Status: DISCONTINUED | OUTPATIENT
Start: 2020-05-12 | End: 2020-05-12 | Stop reason: HOSPADM

## 2020-05-12 RX ORDER — GLYCOPYRROLATE 0.2 MG/ML
INJECTION, SOLUTION INTRAMUSCULAR; INTRAVENOUS PRN
Status: DISCONTINUED | OUTPATIENT
Start: 2020-05-12 | End: 2020-05-12

## 2020-05-12 RX ORDER — ONDANSETRON 2 MG/ML
4 INJECTION INTRAMUSCULAR; INTRAVENOUS EVERY 30 MIN PRN
Status: DISCONTINUED | OUTPATIENT
Start: 2020-05-12 | End: 2020-05-12 | Stop reason: HOSPADM

## 2020-05-12 RX ORDER — CEFAZOLIN SODIUM 1 G/3ML
1 INJECTION, POWDER, FOR SOLUTION INTRAMUSCULAR; INTRAVENOUS SEE ADMIN INSTRUCTIONS
Status: DISCONTINUED | OUTPATIENT
Start: 2020-05-12 | End: 2020-05-12 | Stop reason: HOSPADM

## 2020-05-12 RX ORDER — PROPOFOL 10 MG/ML
INJECTION, EMULSION INTRAVENOUS PRN
Status: DISCONTINUED | OUTPATIENT
Start: 2020-05-12 | End: 2020-05-12

## 2020-05-12 RX ORDER — FENTANYL CITRATE 50 UG/ML
25-50 INJECTION, SOLUTION INTRAMUSCULAR; INTRAVENOUS
Status: DISCONTINUED | OUTPATIENT
Start: 2020-05-12 | End: 2020-05-12 | Stop reason: HOSPADM

## 2020-05-12 RX ORDER — HYDROMORPHONE HYDROCHLORIDE 1 MG/ML
.3-.5 INJECTION, SOLUTION INTRAMUSCULAR; INTRAVENOUS; SUBCUTANEOUS EVERY 10 MIN PRN
Status: DISCONTINUED | OUTPATIENT
Start: 2020-05-12 | End: 2020-05-12 | Stop reason: HOSPADM

## 2020-05-12 RX ORDER — DEXAMETHASONE SODIUM PHOSPHATE 4 MG/ML
INJECTION, SOLUTION INTRA-ARTICULAR; INTRALESIONAL; INTRAMUSCULAR; INTRAVENOUS; SOFT TISSUE PRN
Status: DISCONTINUED | OUTPATIENT
Start: 2020-05-12 | End: 2020-05-12

## 2020-05-12 RX ORDER — NALOXONE HYDROCHLORIDE 0.4 MG/ML
.1-.4 INJECTION, SOLUTION INTRAMUSCULAR; INTRAVENOUS; SUBCUTANEOUS
Status: DISCONTINUED | OUTPATIENT
Start: 2020-05-12 | End: 2020-05-12 | Stop reason: HOSPADM

## 2020-05-12 RX ORDER — ONDANSETRON 4 MG/1
4 TABLET, ORALLY DISINTEGRATING ORAL EVERY 30 MIN PRN
Status: DISCONTINUED | OUTPATIENT
Start: 2020-05-12 | End: 2020-05-12 | Stop reason: HOSPADM

## 2020-05-12 RX ORDER — FENTANYL CITRATE 50 UG/ML
INJECTION, SOLUTION INTRAMUSCULAR; INTRAVENOUS PRN
Status: DISCONTINUED | OUTPATIENT
Start: 2020-05-12 | End: 2020-05-12

## 2020-05-12 RX ORDER — DIMENHYDRINATE 50 MG/ML
25 INJECTION, SOLUTION INTRAMUSCULAR; INTRAVENOUS
Status: DISCONTINUED | OUTPATIENT
Start: 2020-05-12 | End: 2020-05-12 | Stop reason: HOSPADM

## 2020-05-12 RX ORDER — MEPERIDINE HYDROCHLORIDE 50 MG/ML
12.5 INJECTION INTRAMUSCULAR; INTRAVENOUS; SUBCUTANEOUS
Status: DISCONTINUED | OUTPATIENT
Start: 2020-05-12 | End: 2020-05-12 | Stop reason: HOSPADM

## 2020-05-12 RX ORDER — HYDRALAZINE HYDROCHLORIDE 20 MG/ML
2.5-5 INJECTION INTRAMUSCULAR; INTRAVENOUS EVERY 10 MIN PRN
Status: DISCONTINUED | OUTPATIENT
Start: 2020-05-12 | End: 2020-05-12 | Stop reason: HOSPADM

## 2020-05-12 RX ORDER — SCOLOPAMINE TRANSDERMAL SYSTEM 1 MG/1
1 PATCH, EXTENDED RELEASE TRANSDERMAL
Status: DISCONTINUED | OUTPATIENT
Start: 2020-05-12 | End: 2020-05-12 | Stop reason: HOSPADM

## 2020-05-12 RX ORDER — ONDANSETRON 2 MG/ML
INJECTION INTRAMUSCULAR; INTRAVENOUS PRN
Status: DISCONTINUED | OUTPATIENT
Start: 2020-05-12 | End: 2020-05-12

## 2020-05-12 RX ORDER — LIDOCAINE 40 MG/G
CREAM TOPICAL
Status: DISCONTINUED | OUTPATIENT
Start: 2020-05-12 | End: 2020-05-12 | Stop reason: HOSPADM

## 2020-05-12 RX ORDER — LABETALOL 20 MG/4 ML (5 MG/ML) INTRAVENOUS SYRINGE
10
Status: DISCONTINUED | OUTPATIENT
Start: 2020-05-12 | End: 2020-05-12 | Stop reason: HOSPADM

## 2020-05-12 RX ADMIN — ONDANSETRON HYDROCHLORIDE 4 MG: 2 INJECTION, SOLUTION INTRAVENOUS at 14:17

## 2020-05-12 RX ADMIN — MIDAZOLAM 2 MG: 1 INJECTION INTRAMUSCULAR; INTRAVENOUS at 14:13

## 2020-05-12 RX ADMIN — SODIUM CHLORIDE, POTASSIUM CHLORIDE, SODIUM LACTATE AND CALCIUM CHLORIDE: 600; 310; 30; 20 INJECTION, SOLUTION INTRAVENOUS at 14:13

## 2020-05-12 RX ADMIN — DEXAMETHASONE SODIUM PHOSPHATE 4 MG: 4 INJECTION, SOLUTION INTRA-ARTICULAR; INTRALESIONAL; INTRAMUSCULAR; INTRAVENOUS; SOFT TISSUE at 14:17

## 2020-05-12 RX ADMIN — LIDOCAINE HYDROCHLORIDE 50 MG: 10 INJECTION, SOLUTION EPIDURAL; INFILTRATION; INTRACAUDAL; PERINEURAL at 14:17

## 2020-05-12 RX ADMIN — CEFAZOLIN SODIUM 2 G: 2 INJECTION, SOLUTION INTRAVENOUS at 14:21

## 2020-05-12 RX ADMIN — SCOPALAMINE 1 PATCH: 1 PATCH, EXTENDED RELEASE TRANSDERMAL at 13:24

## 2020-05-12 RX ADMIN — FENTANYL CITRATE 100 MCG: 50 INJECTION, SOLUTION INTRAMUSCULAR; INTRAVENOUS at 14:17

## 2020-05-12 RX ADMIN — PROPOFOL 200 MG: 10 INJECTION, EMULSION INTRAVENOUS at 14:17

## 2020-05-12 RX ADMIN — GLYCOPYRROLATE 0.2 MG: 0.2 INJECTION, SOLUTION INTRAMUSCULAR; INTRAVENOUS at 14:17

## 2020-05-12 ASSESSMENT — MIFFLIN-ST. JEOR: SCORE: 1545.62

## 2020-05-12 ASSESSMENT — ENCOUNTER SYMPTOMS
DYSRHYTHMIAS: 0
STRIDOR: 0
SEIZURES: 0

## 2020-05-12 ASSESSMENT — LIFESTYLE VARIABLES: TOBACCO_USE: 0

## 2020-05-12 ASSESSMENT — COPD QUESTIONNAIRES: COPD: 0

## 2020-05-12 NOTE — ANESTHESIA CARE TRANSFER NOTE
Patient: Lisa Betancourt    Procedure(s):  cystoscopy, left ureteroscopy with holmium laser lithotripsy (laser stand-by only), left ureteral stent removal, and left stone basketing    Diagnosis: Renal calculus, left [N20.0]  Diagnosis Additional Information: No value filed.    Anesthesia Type:   General     Note:  Airway :Face Mask  Patient transferred to:PACU  Comments: VSS.  Spontaneously breathing O2 per simple face mask.  Report given to RN.Handoff Report: Identifed the Patient, Identified the Reponsible Provider, Reviewed the pertinent medical history, Discussed the surgical course, Reviewed Intra-OP anesthesia mangement and issues during anesthesia, Set expectations for post-procedure period and Allowed opportunity for questions and acknowledgement of understanding      Vitals: (Last set prior to Anesthesia Care Transfer)    CRNA VITALS  5/12/2020 1419 - 5/12/2020 1456      5/12/2020             Pulse:  105    SpO2:  100 %                Electronically Signed By: SKYE Giron CRNA  May 12, 2020  2:56 PM

## 2020-05-12 NOTE — ANESTHESIA PREPROCEDURE EVALUATION
Anesthesia Pre-Procedure Evaluation    Patient: Lisa Betancourt   MRN: [id number] : 1970          Preoperative Diagnosis: Renal calculus, left [N20.0]    Procedure(s):  cystoscopy, left ureteroscopy with holmium laser lithotripsy and left ureteral stent removal    Past Medical History:   Diagnosis Date     Hypothyroid      Renal disease      Rheumatoid arthritis(714.0)      Past Surgical History:   Procedure Laterality Date     BREAST LUMPECTOMY, RT/LT      Breast Lumpectomy LT     C/SECTION, LOW TRANSVERSE      , Low Transverse     COMBINED CYSTOSCOPY, INSERT STENT URETER(S) Left 3/26/2020    Procedure: Video cystoscopy, left double-J stent placement (5-Irish x 24 cm);  Surgeon: Torres Ware MD;  Location: [facility] OR     HERNIA REPAIR, UMBILICAL  2002     TUBAL LIGATION       Anesthesia Evaluation     . Pt has had prior anesthetic. Type: General    No history of anesthetic complications          ROS/MED HX    ENT/Pulmonary:     (+)TRICE risk factors hypertension, allergic rhinitis, , . .   (-) tobacco use, asthma, COPD and recent URI   Neurologic:     (+)migraines,    (-) seizures and CVA   Cardiovascular:     (+) hypertension----. : . . . :. . Previous cardiac testing date:results:date: results:ECG reviewed date: results:NSR date: results:         (-) CAD, arrhythmias and valvular problems/murmurs   METS/Exercise Tolerance:     Hematologic: Comments: Lab Test        20                       0629          0620          0051          WBC          10.6         14.2*        18.2*         HGB          11.1*        11.7         14.2          MCV          96           98           96            PLT          200          201          263            Lab Test        20                       0629          0620          0051          NA           141          140          136           POTASSIUM    4.3          4.5          3.8            CHLORIDE     113*         113*         106           CO2          25           24           23            BUN          15           16           22            CR           0.96         1.00         1.07*         ANIONGAP     3            3            7             MYRA          8.2*         8.4*         8.9           GLC          81           99           104*         - neg hematologic  ROS       Musculoskeletal:   (+) arthritis,  other musculoskeletal- rheumatoid      GI/Hepatic:  - neg GI/hepatic ROS      (-) GERD   Renal/Genitourinary:     (+) Nephrolithiasis , Pt has no history of transplant,    (-) renal disease   Endo:     (+) thyroid problem Obesity, .   (-) Type I DM, Type II DM and chronic steroid usage   Psychiatric:     (+) psychiatric history depression      Infectious Disease:  - neg infectious disease ROS       Malignancy:      - no malignancy   Other:    - neg other ROS                      Physical Exam  Normal systems: cardiovascular, pulmonary and dental    Airway   Mallampati: II  TM distance: >3 FB  Neck ROM: full    Dental     Cardiovascular   Rhythm and rate: regular and normal  (-) no friction rub, no systolic click and no murmur    Pulmonary    breath sounds clear to auscultation(-) no rhonchi, no decreased breath sounds, no wheezes, no rales and no stridor            Lab Results   Component Value Date    WBC 10.6 03/28/2020    HGB 11.1 (L) 03/28/2020    HCT 34.0 (L) 03/28/2020     03/28/2020     03/28/2020    POTASSIUM 4.3 03/28/2020    CHLORIDE 113 (H) 03/28/2020    CO2 25 03/28/2020    BUN 15 03/28/2020    CR 0.96 03/28/2020    GLC 81 03/28/2020    MYRA 8.2 (L) 03/28/2020       Preop Vitals  BP Readings from Last 3 Encounters:   05/12/20 130/80   03/28/20 122/69   09/12/15 122/88    Pulse Readings from Last 3 Encounters:   03/27/20 64   09/12/15 84      Resp Readings from Last 3 Encounters:   05/12/20 16   03/28/20 16   09/12/15 12    SpO2 Readings from Last 3 Encounters:  "  05/12/20 98%   03/28/20 97%   09/12/15 97%      Temp Readings from Last 1 Encounters:   05/12/20 98.7  F (37.1  C) (Temporal)    Ht Readings from Last 1 Encounters:   05/12/20 1.695 m (5' 6.73\")      Wt Readings from Last 1 Encounters:   05/12/20 88.2 kg (194 lb 8 oz)    Estimated body mass index is 30.71 kg/m  as calculated from the following:    Height as of this encounter: 1.695 m (5' 6.73\").    Weight as of this encounter: 88.2 kg (194 lb 8 oz).       Anesthesia Plan      History & Physical Review  History and physical reviewed and following examination; no interval change.    ASA Status:  2 .    NPO Status:  > 8 hours    Plan for General with Intravenous induction. Maintenance will be Balanced.    PONV prophylaxis:  Ondansetron (or other 5HT-3) and Dexamethasone or Solumedrol         Postoperative Care  Postoperative pain management:  IV analgesics.      Consents  Anesthetic plan, risks, benefits and alternatives discussed with:  Patient or representative and Patient..                 Chato Meeks MD                    .  "

## 2020-05-12 NOTE — OP NOTE
Procedure Date: 05/12/2020      PREOPERATIVE DIAGNOSIS:  Left renal calculus (6 mm).      POSTOPERATIVE DIAGNOSIS:  Left renal calculus (6 mm).      PROCEDURE:  Video cystoscopy, left double-J stent removal, flexible left ureteroscopy and rhinoscopy with stone extraction.      SURGEON:  Jairo Ware MD      ANESTHESIA:  General laryngeal mask.      ESTIMATED BLOOD LOSS:  0 mL.      INDICATIONS:  The patient is a 49-year-old female who underwent cystoscopy and left double-J stent placement March because of a febrile urinary tract infection and obstructing left proximal ureteral calculus.  She now presents for stone extraction.  The procedure, the alternatives, risks and followup were carefully discussed.      DESCRIPTION OF THE PROCEDURE:  The patient was given 2 grams of IV Ancef and taken to surgery and placed supine on the operating table.  After adequate general laryngeal mask anesthesia, the patient was placed in lithotomy position, and her genitalia were prepped and draped in a sterile fashion.  A 22-[employer]orz cystoscope with obturator was gently introduced in the bladder, and residual urine was clear.  Using the 30-degree lens and video and water as an irrigant, the left stent was recognized and grasped with the cup biopsy forceps and brought out to the urethral meatus.  I passed a Glidewire up the stent until it curled in the region of the left renal pelvis under fluoroscopy.  I removed the stent and then passed the flexible [employer] ureteroscope over the Glidewire into the mid ureter.  I removed the Glidewire and examined the proximal end of the ureter and the renal pelvis, where her 6 mm stone was seen, basketed and easily removed and sent for stone analysis.  The bladder was emptied and the cystoscope removed.  The patient tolerated the procedure well and went to the recovery room in stable condition and will be discharged to home this afternoon.         JAIRO WARE JR, MD              D: 2020   T: 2020   MT: ROSMERY      Name:     PATRICK BHAGAT   MRN:      [id number]        Account:        [id number]   :      1970           Procedure Date: 2020      Document: [id number]       cc: Jessica Ware Jr, MD

## 2020-05-12 NOTE — ANESTHESIA POSTPROCEDURE EVALUATION
Patient: Lisa Betancourt    Procedure(s):  cystoscopy, left ureteroscopy with holmium laser lithotripsy (laser stand-by only), left ureteral stent removal, and left stone basketing    Diagnosis:Renal calculus, left [N20.0]  Diagnosis Additional Information: No value filed.    Anesthesia Type:  General    Note:  Anesthesia Post Evaluation    Patient location during evaluation: PACU  Patient participation: Able to fully participate in evaluation  Level of consciousness: awake  Pain management: adequate  Airway patency: patent  Cardiovascular status: acceptable  Respiratory status: acceptable  Hydration status: acceptable  PONV: controlled     Anesthetic complications: None          Last vitals:  Vitals:    05/12/20 1531 05/12/20 1600 05/12/20 1625   BP: 139/84 118/79    Pulse:      Resp: 15 16    Temp: 96  F (35.6  C)     SpO2: 100% 100% 100%         Electronically Signed By: Chato Meeks MD  May 12, 2020  4:30 PM

## 2020-05-12 NOTE — DISCHARGE INSTRUCTIONS
GENERAL ANESTHESIA OR SEDATION ADULT DISCHARGE INSTRUCTIONS   SPECIAL PRECAUTIONS FOR 24 HOURS AFTER SURGERY    IT IS NOT UNUSUAL TO FEEL LIGHT-HEADED OR FAINT, UP TO 24 HOURS AFTER SURGERY OR WHILE TAKING PAIN MEDICATION.  IF YOU HAVE THESE SYMPTOMS; SIT FOR A FEW MINUTES BEFORE STANDING AND HAVE SOMEONE ASSIST YOU WHEN YOU GET UP TO WALK OR USE THE BATHROOM.    YOU SHOULD REST AND RELAX FOR THE NEXT 24 HOURS AND YOU MUST MAKE ARRANGEMENTS TO HAVE SOMEONE STAY WITH YOU FOR AT LEAST 24 HOURS AFTER YOUR DISCHARGE.  AVOID HAZARDOUS AND STRENUOUS ACTIVITIES.  DO NOT MAKE IMPORTANT DECISIONS FOR 24 HOURS.    DO NOT DRIVE ANY VEHICLE OR OPERATE MECHANICAL EQUIPMENT FOR 24 HOURS FOLLOWING THE END OF YOUR SURGERY.  EVEN THOUGH YOU MAY FEEL NORMAL, YOUR REACTIONS MAY BE AFFECTED BY THE MEDICATION YOU HAVE RECEIVED.    DO NOT DRINK ALCOHOLIC BEVERAGES FOR 24 HOURS FOLLOWING YOUR SURGERY.    DRINK CLEAR LIQUIDS (APPLE JUICE, GINGER ALE, 7-UP, BROTH, ETC.).  PROGRESS TO YOUR REGULAR DIET AS YOU FEEL ABLE.    YOU MAY HAVE A DRY MOUTH, A SORE THROAT, MUSCLES ACHES OR TROUBLE SLEEPING.  THESE SHOULD GO AWAY AFTER 24 HOURS.    CALL YOUR DOCTOR FOR ANY OF THE FOLLOWING:  SIGNS OF INFECTION (FEVER, GROWING TENDERNESS AT THE SURGERY SITE, A LARGE AMOUNT OF DRAINAGE OR BLEEDING, SEVERE PAIN, FOUL-SMELLING DRAINAGE, REDNESS OR SWELLING.    IT HAS BEEN OVER 8 TO 10 HOURS SINCE SURGERY AND YOU ARE STILL NOT ABLE TO URINATE (PASS WATER).     CYSTOSCOPY DISCHARGE INSTRUCTIONS  [facility]  [address]  [phone]    YOU MAY GO BACK TO YOUR NORMAL DIET AND ACTIVITY, UNLESS YOUR DOCTOR TELLS YOU NOT TO.    FOR THE NEXT TWO DAYS, YOU MAY NOTICE:    SOME BLOOD IN YOUR URINE.  SOME BURNING WHEN YOU URINATE.  AN URGE TO URINATE MORE OFTEN.  BLADDER SPASMS.    THESE ARE NORMAL AFTER THE PROCEDURE.  THEY SHOULD GO AWAY AFTER A DAY OR TWO.  TO RELIEVE THESE PROBLEMS:     DRINK 6 TO 8 LARGE GLASSES OF WATER EACH DAY  (INCLUDES DRINKS AT MEALS).  THIS WILL HELP CLEAR THE URINE.    TAKE WARM BATHS TO RELIEVE PAIN AND BLADDER SPASMS.  DO NOT ADD ANYTHING TO THE BATH WATER.    YOUR DOCTOR MAY PRESCRIBE PAIN MEDICINE.  YOU MAY ALSO TAKE TYLENOL (ACETAMINOPHEN) FOR PAIN.    CALL YOUR SURGEON IF YOU HAVE:    A FEVER OVER 101 DEGREES.  CHECK YOUR TEMPERATURE UNDER YOUR TONGUE.    CHILLS.    FAILURE TO URINATE (NO URINE COMES OUT WHEN YOU TRY TO USE THE TOILET).  TRY SOAKING IN A BATHTUB FULL OF WARM WATER.  IF STILL NO URINE, CALL YOUR DOCTOR.    A LOT OF BLOOD IN THE URINE, OR BLOOD CLOTS LARGER THAN A NICKEL.      PAIN IN THE BACK OR BELLY AREA (ABDOMEN).    PAIN OR SPASMS THAT ARE NOT RELIEVED BY WARM TUB BATHS AND PAIN MEDICINE.      SEVERE PAIN, BURNING OR OTHER PROBLEMS WHILE PASSING URINE.    PAIN THAT GETS WORSE AFTER TWO DAYS.   Transderm Scop (Scopolamine) Patch    The Transderm Scop patch placed behind your ear helps to prevent nausea and vomiting associated with the use of anesthesia and certain analgesics used during or after many types surgery.  You will need to remove this patch after 3 days.  However, it may be removed sooner if you are no longer concerned about nausea or vomiting.      The most common side effects:  ?  dryness of the mouth  ?  drowsiness  ?  blurred vision  ?  dilation of pupils  ?  disorientation, memory disturbances and/or confusion  ?  dizziness  ?  restlessness  ?  hallucinations  ?  difficulty urinating  ?  skin rash  ?  red or painful eyes    Avoid drinking alcohol while using Transderm Scop patch.  Be careful about driving or operating any machinery while using this medication since it may make you drowsy.  In the unlikely event that you experience pain in the eye, which may be accompanied by widening of the pupil, eye redness and blurred vision, remove the Transderm Scop Patch immediately and consult your doctor.    Removal of Transderm Scop Patch:  To remove the patch simply peel it off your  skin.  Be sure to wash your hands and the area behind your ear thoroughly with soap and water.  The Transderm Scop Patch will continue to have some active ingredient after use.  Therefore, to avoid accidental contact or ingestion by children or pets, fold the used patch in half with the sticky side together and dispose in the trash out of reach of children and pets.  If you wear contact lens, be sure to wash your hands first before handling contact lens.      Removal date:________05/14/20 ______________.

## 2020-05-12 NOTE — BRIEF OP NOTE
Diagnosis: Left renal calculus  Procedure: Video cystoscopy, left double-J stent removal, flexible ureteroscopy and renoscopy with stone extraction  Surgeon: Chaz  Anesthesia: General laryngeal mask  EBL: 0 ml  Findings: Stone in renal pelvis-easily removed with stone basket and sent for analysis

## 2020-05-13 LAB — COPATH REPORT: NORMAL

## 2020-05-16 LAB
APPEARANCE STONE: NORMAL
COMPN STONE: NORMAL
NUMBER STONE: 1
SIZE STONE: NORMAL MM
WT STONE: 33 MG

## 2020-06-22 ENCOUNTER — TELEPHONE (OUTPATIENT)
Dept: UROLOGY | Facility: CLINIC | Age: 50
End: 2020-06-22

## 2020-06-22 NOTE — TELEPHONE ENCOUNTER
Called Lisa back to follow-up on this message. Left message with some general information and that I would review with Dr. Ware when he's in clinic. Will call pt back later this week.  ESTRELLA Ribeiro RN       [facility]    Phone Message    May a detailed message be left on voicemail: yes     Reason for Call: Other: Pt calling to get information about prevention of kidney stones. Pt had bariatric surgery November 2019 just to let you know as a precaution. Please call to discuss.     Action Taken: Message routed to:  [facility] ([facility] urology    Travel Screening: Not Applicable

## 2020-06-26 ENCOUNTER — TELEPHONE (OUTPATIENT)
Dept: UROLOGY | Facility: CLINIC | Age: 50
End: 2020-06-26

## 2020-06-26 DIAGNOSIS — N20.0 KIDNEY STONE: Primary | ICD-10-CM

## 2020-06-26 NOTE — TELEPHONE ENCOUNTER
----- Message from Farzana Castellanos sent at 6/26/2020  8:41 AM CDT -----  Regarding: RE: kidney stone prevention  Can you enter an order for KUB please    ----- Message -----  From: Atiya Ribeiro RN  Sent: 6/26/2020   8:17 AM CDT  To: [facility] - Scheduling Pool  Subject: FW: kidney stone prevention                        ----- Message -----  From: Torres Ware MD  Sent: 6/24/2020   8:13 AM CDT  To: Atiya Ribeiro RN  Subject: RE: kidney stone prevention                      Send her a 24 hour urine kit. Need to check urine Calcium and citrate levels  KUB in 6 mos and see me  ----- Message -----  From: Atiya Ribeiro RN  Sent: 6/23/2020  11:14 AM CDT  To: Torres Ware MD  Subject: kidney stone prevention                          Lisa Garcia was last seen 5/12/20 when she had left stent removal, and stone extraction. She calls today asking about stone extraction. Her stone analysis says 40% calcium oxalate and 60% calcium phosphate.   I went over increasing water intake and dietary guidelines for calcium oxalate stones but she's had a gastric sleeve procedure, so should she have some calcium supplements? Anything else she should do?  Thank you,  Atiya

## 2020-06-26 NOTE — TELEPHONE ENCOUNTER
Message forwarded to clinic  to initiate 24 hour urine and schedule f/u.   C. RENZO Ribeiro    ----- Message from Torres Ware MD sent at 6/24/2020  8:13 AM CDT -----  Regarding: RE: kidney stone prevention  Send her a 24 hour urine kit. Need to check urine Calcium and citrate levels  KUB in 6 mos and see me      ----- Message -----  From: Atiya Ribeiro RN  Sent: 6/23/2020  11:14 AM CDT  To: Torres Ware MD  Subject: kidney stone prevention                          Lisa Garcia was last seen 5/12/20 when she had left stent removal, and stone extraction. She calls today asking about stone extraction. Her stone analysis says 40% calcium oxalate and 60% calcium phosphate.   I went over increasing water intake and dietary guidelines for calcium oxalate stones but she's had a gastric sleeve procedure, so should she have some calcium supplements? Anything else she should do?  Thank you,  Atiya

## 2020-07-07 ENCOUNTER — TRANSFERRED RECORDS (OUTPATIENT)
Dept: HEALTH INFORMATION MANAGEMENT | Facility: CLINIC | Age: 50
End: 2020-07-07

## 2020-07-21 ENCOUNTER — TELEPHONE (OUTPATIENT)
Dept: UROLOGY | Facility: CLINIC | Age: 50
End: 2020-07-21

## 2020-07-21 DIAGNOSIS — Z87.442 PERSONAL HISTORY OF URINARY CALCULI: Primary | ICD-10-CM

## 2020-07-21 NOTE — TELEPHONE ENCOUNTER
----- Message from Luisa Butler sent at 7/16/2020  3:13 PM CDT -----  Regarding: Litholink results  The Litholink results were scanned into Epic today and should be available for review in a few days.\    Luisa

## 2020-11-07 ENCOUNTER — HEALTH MAINTENANCE LETTER (OUTPATIENT)
Age: 50
End: 2020-11-07

## 2021-01-14 ENCOUNTER — APPOINTMENT (OUTPATIENT)
Dept: ULTRASOUND IMAGING | Facility: CLINIC | Age: 51
DRG: 419 | End: 2021-01-14
Attending: PHYSICIAN ASSISTANT
Payer: OTHER GOVERNMENT

## 2021-01-14 ENCOUNTER — HOSPITAL ENCOUNTER (INPATIENT)
Facility: CLINIC | Age: 51
LOS: 2 days | Discharge: HOME | DRG: 419 | End: 2021-01-16
Attending: PHYSICIAN ASSISTANT | Admitting: INTERNAL MEDICINE
Payer: OTHER GOVERNMENT

## 2021-01-14 ENCOUNTER — APPOINTMENT (OUTPATIENT)
Dept: CT IMAGING | Facility: CLINIC | Age: 51
DRG: 419 | End: 2021-01-14
Attending: PHYSICIAN ASSISTANT
Payer: OTHER GOVERNMENT

## 2021-01-14 DIAGNOSIS — N94.9 ADNEXAL CYST: ICD-10-CM

## 2021-01-14 DIAGNOSIS — R93.89 ABNORMAL ULTRASOUND: ICD-10-CM

## 2021-01-14 DIAGNOSIS — K80.50 CHOLEDOCHOLITHIASIS: ICD-10-CM

## 2021-01-14 DIAGNOSIS — R10.9 ACUTE ABDOMINAL PAIN: ICD-10-CM

## 2021-01-14 LAB
ALBUMIN SERPL-MCNC: 3.5 G/DL (ref 3.4–5)
ALP SERPL-CCNC: 86 U/L (ref 40–150)
ALT SERPL W P-5'-P-CCNC: 164 U/L (ref 0–50)
ANION GAP SERPL CALCULATED.3IONS-SCNC: 4 MMOL/L (ref 3–14)
AST SERPL W P-5'-P-CCNC: 106 U/L (ref 0–45)
BASOPHILS # BLD AUTO: 0 10E9/L (ref 0–0.2)
BASOPHILS NFR BLD AUTO: 0.3 %
BILIRUB SERPL-MCNC: 0.3 MG/DL (ref 0.2–1.3)
BUN SERPL-MCNC: 16 MG/DL (ref 7–30)
CALCIUM SERPL-MCNC: 8.5 MG/DL (ref 8.5–10.1)
CHLORIDE SERPL-SCNC: 113 MMOL/L (ref 94–109)
CO2 SERPL-SCNC: 24 MMOL/L (ref 20–32)
CREAT SERPL-MCNC: 0.97 MG/DL (ref 0.52–1.04)
DIFFERENTIAL METHOD BLD: ABNORMAL
EOSINOPHIL # BLD AUTO: 0.5 10E9/L (ref 0–0.7)
EOSINOPHIL NFR BLD AUTO: 3.6 %
ERYTHROCYTE [DISTWIDTH] IN BLOOD BY AUTOMATED COUNT: 14.2 % (ref 10–15)
GFR SERPL CREATININE-BSD FRML MDRD: 69 ML/MIN/{1.73_M2}
GLUCOSE SERPL-MCNC: 97 MG/DL (ref 70–99)
HCT VFR BLD AUTO: 39.2 % (ref 35–47)
HGB BLD-MCNC: 13 G/DL (ref 11.7–15.7)
IMM GRANULOCYTES # BLD: 0.1 10E9/L (ref 0–0.4)
IMM GRANULOCYTES NFR BLD: 0.4 %
LIPASE SERPL-CCNC: 164 U/L (ref 73–393)
LYMPHOCYTES # BLD AUTO: 2.7 10E9/L (ref 0.8–5.3)
LYMPHOCYTES NFR BLD AUTO: 20.7 %
MCH RBC QN AUTO: 32.4 PG (ref 26.5–33)
MCHC RBC AUTO-ENTMCNC: 33.2 G/DL (ref 31.5–36.5)
MCV RBC AUTO: 98 FL (ref 78–100)
MONOCYTES # BLD AUTO: 1 10E9/L (ref 0–1.3)
MONOCYTES NFR BLD AUTO: 7.9 %
NEUTROPHILS # BLD AUTO: 8.8 10E9/L (ref 1.6–8.3)
NEUTROPHILS NFR BLD AUTO: 67.1 %
NRBC # BLD AUTO: 0 10*3/UL
NRBC BLD AUTO-RTO: 0 /100
PLATELET # BLD AUTO: 268 10E9/L (ref 150–450)
POTASSIUM SERPL-SCNC: 3.7 MMOL/L (ref 3.4–5.3)
PROT SERPL-MCNC: 7.3 G/DL (ref 6.8–8.8)
RBC # BLD AUTO: 4.01 10E12/L (ref 3.8–5.2)
SODIUM SERPL-SCNC: 141 MMOL/L (ref 133–144)
WBC # BLD AUTO: 13.1 10E9/L (ref 4–11)

## 2021-01-14 PROCEDURE — 85025 COMPLETE CBC W/AUTO DIFF WBC: CPT | Performed by: PHYSICIAN ASSISTANT

## 2021-01-14 PROCEDURE — 80053 COMPREHEN METABOLIC PANEL: CPT | Performed by: PHYSICIAN ASSISTANT

## 2021-01-14 PROCEDURE — 99223 1ST HOSP IP/OBS HIGH 75: CPT | Mod: AI | Performed by: INTERNAL MEDICINE

## 2021-01-14 PROCEDURE — 96374 THER/PROPH/DIAG INJ IV PUSH: CPT | Mod: 59

## 2021-01-14 PROCEDURE — 258N000003 HC RX IP 258 OP 636: Performed by: PHYSICIAN ASSISTANT

## 2021-01-14 PROCEDURE — 120N000001 HC R&B MED SURG/OB

## 2021-01-14 PROCEDURE — 96376 TX/PRO/DX INJ SAME DRUG ADON: CPT

## 2021-01-14 PROCEDURE — 76705 ECHO EXAM OF ABDOMEN: CPT

## 2021-01-14 PROCEDURE — 74177 CT ABD & PELVIS W/CONTRAST: CPT

## 2021-01-14 PROCEDURE — 99285 EMERGENCY DEPT VISIT HI MDM: CPT | Mod: 25

## 2021-01-14 PROCEDURE — 96375 TX/PRO/DX INJ NEW DRUG ADDON: CPT

## 2021-01-14 PROCEDURE — 96361 HYDRATE IV INFUSION ADD-ON: CPT

## 2021-01-14 PROCEDURE — 250N000011 HC RX IP 250 OP 636: Performed by: PHYSICIAN ASSISTANT

## 2021-01-14 PROCEDURE — 83690 ASSAY OF LIPASE: CPT | Performed by: PHYSICIAN ASSISTANT

## 2021-01-14 RX ORDER — HYDROMORPHONE HYDROCHLORIDE 1 MG/ML
0.5 INJECTION, SOLUTION INTRAMUSCULAR; INTRAVENOUS; SUBCUTANEOUS
Status: DISCONTINUED | OUTPATIENT
Start: 2021-01-14 | End: 2021-01-15

## 2021-01-14 RX ORDER — ONDANSETRON 2 MG/ML
4 INJECTION INTRAMUSCULAR; INTRAVENOUS EVERY 30 MIN PRN
Status: DISCONTINUED | OUTPATIENT
Start: 2021-01-14 | End: 2021-01-15

## 2021-01-14 RX ORDER — IOPAMIDOL 755 MG/ML
500 INJECTION, SOLUTION INTRAVASCULAR ONCE
Status: COMPLETED | OUTPATIENT
Start: 2021-01-14 | End: 2021-01-14

## 2021-01-14 RX ORDER — HYDROMORPHONE HYDROCHLORIDE 1 MG/ML
0.5 INJECTION, SOLUTION INTRAMUSCULAR; INTRAVENOUS; SUBCUTANEOUS
Status: COMPLETED | OUTPATIENT
Start: 2021-01-14 | End: 2021-01-14

## 2021-01-14 RX ORDER — SODIUM CHLORIDE 9 MG/ML
INJECTION, SOLUTION INTRAVENOUS CONTINUOUS
Status: DISCONTINUED | OUTPATIENT
Start: 2021-01-14 | End: 2021-01-15

## 2021-01-14 RX ADMIN — IOPAMIDOL 85 ML: 755 INJECTION, SOLUTION INTRAVENOUS at 20:47

## 2021-01-14 RX ADMIN — SODIUM CHLORIDE 1000 ML: 9 INJECTION, SOLUTION INTRAVENOUS at 19:47

## 2021-01-14 RX ADMIN — HYDROMORPHONE HYDROCHLORIDE 0.5 MG: 1 INJECTION, SOLUTION INTRAMUSCULAR; INTRAVENOUS; SUBCUTANEOUS at 21:30

## 2021-01-14 RX ADMIN — HYDROMORPHONE HYDROCHLORIDE 0.5 MG: 1 INJECTION, SOLUTION INTRAMUSCULAR; INTRAVENOUS; SUBCUTANEOUS at 19:48

## 2021-01-14 RX ADMIN — ONDANSETRON 4 MG: 2 INJECTION INTRAMUSCULAR; INTRAVENOUS at 19:48

## 2021-01-14 ASSESSMENT — ENCOUNTER SYMPTOMS
BLOOD IN STOOL: 0
DIARRHEA: 0
FEVER: 0
SHORTNESS OF BREATH: 0
NAUSEA: 1
ABDOMINAL PAIN: 1
CHILLS: 0
VOMITING: 1

## 2021-01-14 ASSESSMENT — MIFFLIN-ST. JEOR: SCORE: 1417.86

## 2021-01-15 ENCOUNTER — APPOINTMENT (OUTPATIENT)
Dept: INTERVENTIONAL RADIOLOGY/VASCULAR | Facility: CLINIC | Age: 51
DRG: 419 | End: 2021-01-15
Attending: PHYSICIAN ASSISTANT
Payer: OTHER GOVERNMENT

## 2021-01-15 ENCOUNTER — ANESTHESIA (OUTPATIENT)
Dept: SURGERY | Facility: CLINIC | Age: 51
DRG: 419 | End: 2021-01-15
Payer: OTHER GOVERNMENT

## 2021-01-15 ENCOUNTER — ANESTHESIA EVENT (OUTPATIENT)
Dept: SURGERY | Facility: CLINIC | Age: 51
DRG: 419 | End: 2021-01-15
Payer: OTHER GOVERNMENT

## 2021-01-15 LAB
ERCP: NORMAL
INR PPP: 1.11 (ref 0.86–1.14)
LABORATORY COMMENT REPORT: NORMAL
PLATELET # BLD AUTO: 248 10E9/L (ref 150–450)
SARS-COV-2 RNA RESP QL NAA+PROBE: NEGATIVE
SPECIMEN SOURCE: NORMAL

## 2021-01-15 PROCEDURE — 250N000009 HC RX 250: Performed by: INTERNAL MEDICINE

## 2021-01-15 PROCEDURE — 96376 TX/PRO/DX INJ SAME DRUG ADON: CPT

## 2021-01-15 PROCEDURE — 120N000001 HC R&B MED SURG/OB

## 2021-01-15 PROCEDURE — 272N000001 HC OR GENERAL SUPPLY STERILE: Performed by: INTERNAL MEDICINE

## 2021-01-15 PROCEDURE — 250N000009 HC RX 250

## 2021-01-15 PROCEDURE — 999N000059 HC STATISTIC ERCP (OR PROCEDURE): Performed by: INTERNAL MEDICINE

## 2021-01-15 PROCEDURE — 250N000009 HC RX 250: Performed by: PHYSICIAN ASSISTANT

## 2021-01-15 PROCEDURE — 0FC98ZZ EXTIRPATION OF MATTER FROM COMMON BILE DUCT, VIA NATURAL OR ARTIFICIAL OPENING ENDOSCOPIC: ICD-10-PCS | Performed by: INTERNAL MEDICINE

## 2021-01-15 PROCEDURE — 85049 AUTOMATED PLATELET COUNT: CPT | Performed by: PHYSICIAN ASSISTANT

## 2021-01-15 PROCEDURE — 250N000011 HC RX IP 250 OP 636: Performed by: INTERNAL MEDICINE

## 2021-01-15 PROCEDURE — 710N000009 HC RECOVERY PHASE 1, LEVEL 1, PER MIN: Performed by: INTERNAL MEDICINE

## 2021-01-15 PROCEDURE — 250N000011 HC RX IP 250 OP 636: Performed by: PHYSICIAN ASSISTANT

## 2021-01-15 PROCEDURE — 96375 TX/PRO/DX INJ NEW DRUG ADDON: CPT

## 2021-01-15 PROCEDURE — 258N000003 HC RX IP 258 OP 636: Performed by: ANESTHESIOLOGY

## 2021-01-15 PROCEDURE — C9803 HOPD COVID-19 SPEC COLLECT: HCPCS

## 2021-01-15 PROCEDURE — 250N000011 HC RX IP 250 OP 636

## 2021-01-15 PROCEDURE — 87635 SARS-COV-2 COVID-19 AMP PRB: CPT | Performed by: EMERGENCY MEDICINE

## 2021-01-15 PROCEDURE — 36415 COLL VENOUS BLD VENIPUNCTURE: CPT | Performed by: PHYSICIAN ASSISTANT

## 2021-01-15 PROCEDURE — 85610 PROTHROMBIN TIME: CPT | Performed by: PHYSICIAN ASSISTANT

## 2021-01-15 PROCEDURE — 370N000017 HC ANESTHESIA TECHNICAL FEE, PER MIN: Performed by: INTERNAL MEDICINE

## 2021-01-15 PROCEDURE — 250N000013 HC RX MED GY IP 250 OP 250 PS 637: Performed by: INTERNAL MEDICINE

## 2021-01-15 PROCEDURE — 99214 OFFICE O/P EST MOD 30 MIN: CPT | Mod: 57 | Performed by: SURGERY

## 2021-01-15 PROCEDURE — 99207 PR CDG-MDM COMPONENT: MEETS LOW - DOWN CODED: CPT | Performed by: INTERNAL MEDICINE

## 2021-01-15 PROCEDURE — 250N000011 HC RX IP 250 OP 636: Performed by: ANESTHESIOLOGY

## 2021-01-15 PROCEDURE — 272N000002 HC OR SUPPLY OTHER OPNP: Performed by: INTERNAL MEDICINE

## 2021-01-15 PROCEDURE — 999N000141 HC STATISTIC PRE-PROCEDURE NURSING ASSESSMENT: Performed by: INTERNAL MEDICINE

## 2021-01-15 PROCEDURE — 272N000001 HC OR GENERAL SUPPLY STERILE: Performed by: SURGERY

## 2021-01-15 PROCEDURE — 87040 BLOOD CULTURE FOR BACTERIA: CPT | Performed by: INTERNAL MEDICINE

## 2021-01-15 PROCEDURE — 258N000003 HC RX IP 258 OP 636: Performed by: INTERNAL MEDICINE

## 2021-01-15 PROCEDURE — 360N000082 HC SURGERY LEVEL 2 W/ FLUORO, PER MIN: Performed by: INTERNAL MEDICINE

## 2021-01-15 PROCEDURE — 99231 SBSQ HOSP IP/OBS SF/LOW 25: CPT | Performed by: INTERNAL MEDICINE

## 2021-01-15 PROCEDURE — 87040 BLOOD CULTURE FOR BACTERIA: CPT | Performed by: PHYSICIAN ASSISTANT

## 2021-01-15 RX ORDER — PROCHLORPERAZINE MALEATE 5 MG
10 TABLET ORAL EVERY 6 HOURS PRN
Status: DISCONTINUED | OUTPATIENT
Start: 2021-01-15 | End: 2021-01-16 | Stop reason: HOSPADM

## 2021-01-15 RX ORDER — ONDANSETRON 2 MG/ML
INJECTION INTRAMUSCULAR; INTRAVENOUS PRN
Status: DISCONTINUED | OUTPATIENT
Start: 2021-01-15 | End: 2021-01-15

## 2021-01-15 RX ORDER — NALOXONE HYDROCHLORIDE 0.4 MG/ML
0.2 INJECTION, SOLUTION INTRAMUSCULAR; INTRAVENOUS; SUBCUTANEOUS
Status: DISCONTINUED | OUTPATIENT
Start: 2021-01-15 | End: 2021-01-15 | Stop reason: HOSPADM

## 2021-01-15 RX ORDER — MEPERIDINE HYDROCHLORIDE 25 MG/ML
12.5 INJECTION INTRAMUSCULAR; INTRAVENOUS; SUBCUTANEOUS
Status: DISCONTINUED | OUTPATIENT
Start: 2021-01-15 | End: 2021-01-15 | Stop reason: HOSPADM

## 2021-01-15 RX ORDER — GLYCOPYRROLATE 0.2 MG/ML
INJECTION, SOLUTION INTRAMUSCULAR; INTRAVENOUS PRN
Status: DISCONTINUED | OUTPATIENT
Start: 2021-01-15 | End: 2021-01-15

## 2021-01-15 RX ORDER — NALOXONE HYDROCHLORIDE 0.4 MG/ML
0.4 INJECTION, SOLUTION INTRAMUSCULAR; INTRAVENOUS; SUBCUTANEOUS
Status: DISCONTINUED | OUTPATIENT
Start: 2021-01-15 | End: 2021-01-16 | Stop reason: HOSPADM

## 2021-01-15 RX ORDER — AMLODIPINE BESYLATE 10 MG/1
5 TABLET ORAL DAILY
COMMUNITY

## 2021-01-15 RX ORDER — NALOXONE HYDROCHLORIDE 0.4 MG/ML
0.4 INJECTION, SOLUTION INTRAMUSCULAR; INTRAVENOUS; SUBCUTANEOUS
Status: DISCONTINUED | OUTPATIENT
Start: 2021-01-15 | End: 2021-01-16

## 2021-01-15 RX ORDER — NALOXONE HYDROCHLORIDE 0.4 MG/ML
0.2 INJECTION, SOLUTION INTRAMUSCULAR; INTRAVENOUS; SUBCUTANEOUS
Status: DISCONTINUED | OUTPATIENT
Start: 2021-01-15 | End: 2021-01-16

## 2021-01-15 RX ORDER — LIDOCAINE 40 MG/G
CREAM TOPICAL
Status: DISCONTINUED | OUTPATIENT
Start: 2021-01-15 | End: 2021-01-15 | Stop reason: HOSPADM

## 2021-01-15 RX ORDER — PROPOFOL 10 MG/ML
INJECTION, EMULSION INTRAVENOUS PRN
Status: DISCONTINUED | OUTPATIENT
Start: 2021-01-15 | End: 2021-01-15

## 2021-01-15 RX ORDER — NALOXONE HYDROCHLORIDE 0.4 MG/ML
0.4 INJECTION, SOLUTION INTRAMUSCULAR; INTRAVENOUS; SUBCUTANEOUS
Status: DISCONTINUED | OUTPATIENT
Start: 2021-01-15 | End: 2021-01-15 | Stop reason: HOSPADM

## 2021-01-15 RX ORDER — FLUTICASONE PROPIONATE 50 MCG
2 SPRAY, SUSPENSION (ML) NASAL DAILY PRN
Status: DISCONTINUED | OUTPATIENT
Start: 2021-01-15 | End: 2021-01-16 | Stop reason: HOSPADM

## 2021-01-15 RX ORDER — SODIUM CHLORIDE, SODIUM LACTATE, POTASSIUM CHLORIDE, CALCIUM CHLORIDE 600; 310; 30; 20 MG/100ML; MG/100ML; MG/100ML; MG/100ML
INJECTION, SOLUTION INTRAVENOUS CONTINUOUS
Status: DISCONTINUED | OUTPATIENT
Start: 2021-01-15 | End: 2021-01-15 | Stop reason: HOSPADM

## 2021-01-15 RX ORDER — DEXTROSE MONOHYDRATE, SODIUM CHLORIDE, AND POTASSIUM CHLORIDE 50; 1.49; 4.5 G/1000ML; G/1000ML; G/1000ML
INJECTION, SOLUTION INTRAVENOUS CONTINUOUS
Status: DISCONTINUED | OUTPATIENT
Start: 2021-01-15 | End: 2021-01-16 | Stop reason: HOSPADM

## 2021-01-15 RX ORDER — LIDOCAINE HYDROCHLORIDE 10 MG/ML
INJECTION, SOLUTION INFILTRATION; PERINEURAL PRN
Status: DISCONTINUED | OUTPATIENT
Start: 2021-01-15 | End: 2021-01-15

## 2021-01-15 RX ORDER — FENTANYL CITRATE 50 UG/ML
25-50 INJECTION, SOLUTION INTRAMUSCULAR; INTRAVENOUS
Status: DISCONTINUED | OUTPATIENT
Start: 2021-01-15 | End: 2021-01-15 | Stop reason: HOSPADM

## 2021-01-15 RX ORDER — ONDANSETRON 2 MG/ML
4 INJECTION INTRAMUSCULAR; INTRAVENOUS EVERY 30 MIN PRN
Status: DISCONTINUED | OUTPATIENT
Start: 2021-01-15 | End: 2021-01-15 | Stop reason: HOSPADM

## 2021-01-15 RX ORDER — DEXAMETHASONE SODIUM PHOSPHATE 4 MG/ML
INJECTION, SOLUTION INTRA-ARTICULAR; INTRALESIONAL; INTRAMUSCULAR; INTRAVENOUS; SOFT TISSUE PRN
Status: DISCONTINUED | OUTPATIENT
Start: 2021-01-15 | End: 2021-01-15

## 2021-01-15 RX ORDER — AMPICILLIN AND SULBACTAM 2; 1 G/1; G/1
3 INJECTION, POWDER, FOR SOLUTION INTRAMUSCULAR; INTRAVENOUS EVERY 6 HOURS
Status: DISCONTINUED | OUTPATIENT
Start: 2021-01-15 | End: 2021-01-16 | Stop reason: HOSPADM

## 2021-01-15 RX ORDER — ONDANSETRON 4 MG/1
4 TABLET, ORALLY DISINTEGRATING ORAL EVERY 30 MIN PRN
Status: DISCONTINUED | OUTPATIENT
Start: 2021-01-15 | End: 2021-01-15 | Stop reason: HOSPADM

## 2021-01-15 RX ORDER — RIZATRIPTAN BENZOATE 10 MG/1
10 TABLET, ORALLY DISINTEGRATING ORAL
Status: DISCONTINUED | OUTPATIENT
Start: 2021-01-15 | End: 2021-01-16 | Stop reason: HOSPADM

## 2021-01-15 RX ORDER — NEOSTIGMINE METHYLSULFATE 1 MG/ML
VIAL (ML) INJECTION PRN
Status: DISCONTINUED | OUTPATIENT
Start: 2021-01-15 | End: 2021-01-15

## 2021-01-15 RX ORDER — NALOXONE HYDROCHLORIDE 0.4 MG/ML
0.2 INJECTION, SOLUTION INTRAMUSCULAR; INTRAVENOUS; SUBCUTANEOUS
Status: DISCONTINUED | OUTPATIENT
Start: 2021-01-15 | End: 2021-01-16 | Stop reason: HOSPADM

## 2021-01-15 RX ORDER — ONDANSETRON 2 MG/ML
4 INJECTION INTRAMUSCULAR; INTRAVENOUS EVERY 6 HOURS PRN
Status: DISCONTINUED | OUTPATIENT
Start: 2021-01-15 | End: 2021-01-16 | Stop reason: HOSPADM

## 2021-01-15 RX ORDER — HYDROMORPHONE HYDROCHLORIDE 1 MG/ML
.3-.5 INJECTION, SOLUTION INTRAMUSCULAR; INTRAVENOUS; SUBCUTANEOUS EVERY 10 MIN PRN
Status: DISCONTINUED | OUTPATIENT
Start: 2021-01-15 | End: 2021-01-15 | Stop reason: HOSPADM

## 2021-01-15 RX ORDER — AMLODIPINE BESYLATE 10 MG/1
10 TABLET ORAL DAILY
Status: DISCONTINUED | OUTPATIENT
Start: 2021-01-15 | End: 2021-01-15 | Stop reason: ALTCHOICE

## 2021-01-15 RX ORDER — LEVOTHYROXINE SODIUM 175 UG/1
175 TABLET ORAL DAILY
Status: DISCONTINUED | OUTPATIENT
Start: 2021-01-15 | End: 2021-01-16 | Stop reason: HOSPADM

## 2021-01-15 RX ORDER — OXYBUTYNIN CHLORIDE 5 MG/1
5 TABLET ORAL 2 TIMES DAILY
Status: DISCONTINUED | OUTPATIENT
Start: 2021-01-15 | End: 2021-01-15

## 2021-01-15 RX ORDER — FLUMAZENIL 0.1 MG/ML
0.2 INJECTION, SOLUTION INTRAVENOUS
Status: ACTIVE | OUTPATIENT
Start: 2021-01-15 | End: 2021-01-16

## 2021-01-15 RX ORDER — AMLODIPINE BESYLATE 5 MG/1
5 TABLET ORAL DAILY
Status: DISCONTINUED | OUTPATIENT
Start: 2021-01-16 | End: 2021-01-16 | Stop reason: HOSPADM

## 2021-01-15 RX ORDER — ONDANSETRON 4 MG/1
4 TABLET, ORALLY DISINTEGRATING ORAL EVERY 6 HOURS PRN
Status: DISCONTINUED | OUTPATIENT
Start: 2021-01-15 | End: 2021-01-16 | Stop reason: HOSPADM

## 2021-01-15 RX ORDER — HYDROMORPHONE HYDROCHLORIDE 1 MG/ML
.3-.5 INJECTION, SOLUTION INTRAMUSCULAR; INTRAVENOUS; SUBCUTANEOUS
Status: DISCONTINUED | OUTPATIENT
Start: 2021-01-15 | End: 2021-01-16 | Stop reason: HOSPADM

## 2021-01-15 RX ORDER — LIDOCAINE 40 MG/G
CREAM TOPICAL
Status: DISCONTINUED | OUTPATIENT
Start: 2021-01-15 | End: 2021-01-16 | Stop reason: HOSPADM

## 2021-01-15 RX ORDER — PROCHLORPERAZINE 25 MG
25 SUPPOSITORY, RECTAL RECTAL EVERY 12 HOURS PRN
Status: DISCONTINUED | OUTPATIENT
Start: 2021-01-15 | End: 2021-01-16 | Stop reason: HOSPADM

## 2021-01-15 RX ORDER — FENTANYL CITRATE 50 UG/ML
INJECTION, SOLUTION INTRAMUSCULAR; INTRAVENOUS PRN
Status: DISCONTINUED | OUTPATIENT
Start: 2021-01-15 | End: 2021-01-15

## 2021-01-15 RX ORDER — INDOMETHACIN 50 MG/1
100 SUPPOSITORY RECTAL
Status: COMPLETED | OUTPATIENT
Start: 2021-01-15 | End: 2021-01-15

## 2021-01-15 RX ADMIN — FENTANYL CITRATE 100 MCG: 50 INJECTION, SOLUTION INTRAMUSCULAR; INTRAVENOUS at 14:04

## 2021-01-15 RX ADMIN — ONDANSETRON HYDROCHLORIDE 4 MG: 2 INJECTION, SOLUTION INTRAVENOUS at 14:18

## 2021-01-15 RX ADMIN — POTASSIUM CHLORIDE, DEXTROSE MONOHYDRATE AND SODIUM CHLORIDE: 150; 5; 450 INJECTION, SOLUTION INTRAVENOUS at 22:02

## 2021-01-15 RX ADMIN — SODIUM CHLORIDE, POTASSIUM CHLORIDE, SODIUM LACTATE AND CALCIUM CHLORIDE: 600; 310; 30; 20 INJECTION, SOLUTION INTRAVENOUS at 13:20

## 2021-01-15 RX ADMIN — HYDROMORPHONE HYDROCHLORIDE 0.3 MG: 1 INJECTION, SOLUTION INTRAMUSCULAR; INTRAVENOUS; SUBCUTANEOUS at 07:04

## 2021-01-15 RX ADMIN — POTASSIUM CHLORIDE, DEXTROSE MONOHYDRATE AND SODIUM CHLORIDE: 150; 5; 450 INJECTION, SOLUTION INTRAVENOUS at 02:24

## 2021-01-15 RX ADMIN — DEXAMETHASONE SODIUM PHOSPHATE 4 MG: 4 INJECTION, SOLUTION INTRA-ARTICULAR; INTRALESIONAL; INTRAMUSCULAR; INTRAVENOUS; SOFT TISSUE at 14:04

## 2021-01-15 RX ADMIN — LIDOCAINE HYDROCHLORIDE 50 MG: 10 INJECTION, SOLUTION INFILTRATION; PERINEURAL at 14:04

## 2021-01-15 RX ADMIN — ROCURONIUM BROMIDE 35 MG: 10 INJECTION INTRAVENOUS at 14:04

## 2021-01-15 RX ADMIN — Medication 5 MG: at 14:30

## 2021-01-15 RX ADMIN — HYDROMORPHONE HYDROCHLORIDE 0.5 MG: 1 INJECTION, SOLUTION INTRAMUSCULAR; INTRAVENOUS; SUBCUTANEOUS at 21:02

## 2021-01-15 RX ADMIN — HYDROMORPHONE HYDROCHLORIDE 0.3 MG: 1 INJECTION, SOLUTION INTRAMUSCULAR; INTRAVENOUS; SUBCUTANEOUS at 12:11

## 2021-01-15 RX ADMIN — GLYCOPYRROLATE 0.6 MG: 0.2 INJECTION, SOLUTION INTRAMUSCULAR; INTRAVENOUS at 14:30

## 2021-01-15 RX ADMIN — AMPICILLIN SODIUM AND SULBACTAM SODIUM 3 G: 2; 1 INJECTION, POWDER, FOR SOLUTION INTRAMUSCULAR; INTRAVENOUS at 19:16

## 2021-01-15 RX ADMIN — AMPICILLIN SODIUM AND SULBACTAM SODIUM 3 G: 2; 1 INJECTION, POWDER, FOR SOLUTION INTRAMUSCULAR; INTRAVENOUS at 11:52

## 2021-01-15 RX ADMIN — AMPICILLIN SODIUM AND SULBACTAM SODIUM 3 G: 2; 1 INJECTION, POWDER, FOR SOLUTION INTRAMUSCULAR; INTRAVENOUS at 01:19

## 2021-01-15 RX ADMIN — FENTANYL CITRATE 50 MCG: 50 INJECTION, SOLUTION INTRAMUSCULAR; INTRAVENOUS at 15:05

## 2021-01-15 RX ADMIN — FAMOTIDINE 20 MG: 10 INJECTION, SOLUTION INTRAVENOUS at 23:54

## 2021-01-15 RX ADMIN — FAMOTIDINE 20 MG: 10 INJECTION, SOLUTION INTRAVENOUS at 11:52

## 2021-01-15 RX ADMIN — ONDANSETRON 4 MG: 2 INJECTION INTRAMUSCULAR; INTRAVENOUS at 20:59

## 2021-01-15 RX ADMIN — HYDROMORPHONE HYDROCHLORIDE 0.5 MG: 1 INJECTION, SOLUTION INTRAMUSCULAR; INTRAVENOUS; SUBCUTANEOUS at 00:23

## 2021-01-15 RX ADMIN — PROPOFOL 150 MG: 10 INJECTION, EMULSION INTRAVENOUS at 14:04

## 2021-01-15 RX ADMIN — LEVOTHYROXINE SODIUM 175 MCG: 0.17 TABLET ORAL at 09:42

## 2021-01-15 RX ADMIN — MIDAZOLAM 2 MG: 1 INJECTION INTRAMUSCULAR; INTRAVENOUS at 13:56

## 2021-01-15 RX ADMIN — AMPICILLIN SODIUM AND SULBACTAM SODIUM 3 G: 2; 1 INJECTION, POWDER, FOR SOLUTION INTRAMUSCULAR; INTRAVENOUS at 07:04

## 2021-01-15 ASSESSMENT — ACTIVITIES OF DAILY LIVING (ADL)
ADLS_ACUITY_SCORE: 10
CONCENTRATING,_REMEMBERING_OR_MAKING_DECISIONS_DIFFICULTY: NO
DRESSING/BATHING_DIFFICULTY: NO
PATIENT_/_FAMILY_COMMUNICATION_STYLE: SPOKEN LANGUAGE (ENGLISH OR BILINGUAL)
WEAR_GLASSES_OR_BLIND: NO
TOILETING_ISSUES: NO
WALKING_OR_CLIMBING_STAIRS_DIFFICULTY: NO
DOING_ERRANDS_INDEPENDENTLY_DIFFICULTY: NO
DIFFICULTY_EATING/SWALLOWING: NO
ADLS_ACUITY_SCORE: 10
FALL_HISTORY_WITHIN_LAST_SIX_MONTHS: NO
HEARING_DIFFICULTY_OR_DEAF: NO
DIFFICULTY_COMMUNICATING: NO

## 2021-01-15 ASSESSMENT — MIFFLIN-ST. JEOR: SCORE: 1491.35

## 2021-01-15 ASSESSMENT — ENCOUNTER SYMPTOMS: DYSRHYTHMIAS: 0

## 2021-01-15 NOTE — ANESTHESIA PREPROCEDURE EVALUATION
Anesthesia Pre-Procedure Evaluation    Patient: Lisa Betancourt   MRN: [id number] : 1970          Preoperative Diagnosis: Cholecystitis [K81.9]    Procedure(s):  ENDOSCOPIC RETROGRADE CHOLANGIOPANCREATOGRAPHY    Past Medical History:   Diagnosis Date     Hypothyroid      Renal disease      Rheumatoid arthritis(714.0)      Past Surgical History:   Procedure Laterality Date     BREAST LUMPECTOMY, RT/LT      Breast Lumpectomy LT     C/SECTION, LOW TRANSVERSE      , Low Transverse     COMBINED CYSTOSCOPY, INSERT STENT URETER(S) Left 3/26/2020    Procedure: Video cystoscopy, left double-J stent placement (5-Turkish x 24 cm);  Surgeon: Torres Ware MD;  Location: [facility] OR     HERNIA REPAIR, UMBILICAL  2002     LASER HOLMIUM LITHOTRIPSY URETER(S), INSERT STENT, COMBINED Left 2020    Procedure: Video cystoscopy, left double-J stent removal, flexible left ureteroscopy and rhinoscopy with stone extraction. (laser on standby only) ;  Surgeon: Torres Ware MD;  Location: [facility] OR     TUBAL LIGATION       Anesthesia Evaluation     .             ROS/MED HX    ENT/Pulmonary:  - neg pulmonary ROS     Neurologic:  - neg neurologic ROS     Cardiovascular:        (-) hypertension, CAD, arrhythmias, pulmonary hypertension and dyslipidemia   METS/Exercise Tolerance:     Hematologic:  - neg hematologic  ROS       Musculoskeletal:   (+) arthritis (RA),  -       GI/Hepatic:         Renal/Genitourinary:     (+) Nephrolithiasis ,       Endo:     (+) thyroid problem .      Psychiatric:  - neg psychiatric ROS       Infectious Disease:  - neg infectious disease ROS       Malignancy:      - no malignancy   Other:                          Physical Exam      Airway   Mallampati: II  TM distance: >3 FB  Neck ROM: full    Dental     Cardiovascular   Rhythm and rate: regular and normal      Pulmonary    breath sounds clear to auscultation            Lab Results   Component Value Date    WBC 13.1 (H) 2021    HGB 13.0  "01/14/2021    HCT 39.2 01/14/2021     01/15/2021     01/14/2021    POTASSIUM 3.7 01/14/2021    CHLORIDE 113 (H) 01/14/2021    CO2 24 01/14/2021    BUN 16 01/14/2021    CR 0.97 01/14/2021    GLC 97 01/14/2021    MYRA 8.5 01/14/2021    ALBUMIN 3.5 01/14/2021    PROTTOTAL 7.3 01/14/2021     (H) 01/14/2021     (H) 01/14/2021    ALKPHOS 86 01/14/2021    BILITOTAL 0.3 01/14/2021    LIPASE 164 01/14/2021    INR 1.11 01/15/2021       Preop Vitals  BP Readings from Last 3 Encounters:   01/15/21 123/71   05/12/20 118/79   03/28/20 122/69    Pulse Readings from Last 3 Encounters:   01/15/21 68   05/12/20 61   03/27/20 64      Resp Readings from Last 3 Encounters:   01/15/21 16   05/12/20 16   03/28/20 16    SpO2 Readings from Last 3 Encounters:   01/15/21 98%   05/12/20 100%   03/28/20 97%      Temp Readings from Last 1 Encounters:   01/15/21 98.4  F (36.9  C) (Temporal)    Ht Readings from Last 1 Encounters:   01/15/21 1.676 m (5' 6\")      Wt Readings from Last 1 Encounters:   01/15/21 84.5 kg (186 lb 3.2 oz)    Estimated body mass index is 30.05 kg/m  as calculated from the following:    Height as of this encounter: 1.676 m (5' 6\").    Weight as of this encounter: 84.5 kg (186 lb 3.2 oz).       Anesthesia Plan      History & Physical Review  History and physical reviewed and following examination; no interval change.    ASA Status:  2 .    NPO Status:  > 8 hours    Plan for General with Propofol induction. Maintenance will be Balanced.    PONV prophylaxis:  Ondansetron (or other 5HT-3) and Dexamethasone or Solumedrol         Postoperative Care  Postoperative pain management:  IV analgesics and Oral pain medications.      Consents  Anesthetic plan, risks, benefits and alternatives discussed with:  Patient..                 Rod Luu MD                    .  "

## 2021-01-15 NOTE — ED PROVIDER NOTES
Emergency Department Supervision Note  1/14/2021  7:35 PM      I evaluated this patient in conjunction with Paula Carlisle PA-C.      Briefly, the patient presented with 8/10 sudden onset abdominal pain, nausea, and one episode of vomiting one hour prior to evaluation. The pain began 0.5 hours after finishing her dinner, which was ~1 cup of fish and mac-n-cheese. Notably, she had gastric sleeve surgery one year ago and has never had any issues with this. She denies any diarrhea, bloody stools, bloody vomit, chest pain, shortness of breath, fever, or chills.      On my exam, she demonstrates R sided abdominal tenderness.     Results:  CT Abdomen Pelvis w Contrast  IMPRESSION:   1.  Gastric sleeve procedure, with moderate gastric distention.  2.  Dilated common bile duct is more prominent compared to the previous exam. No obvious obstructing lesion. Correlation with liver function tests recommended.  3.  Numerous slightly distended loops of small bowel within the left abdomen and pelvis, nonspecific. No inflammatory changes or other evidence for obstruction.   4.  Right adnexal cyst, presumed physiologic. Please see guidelines below for follow-up.  5.  Cervical nabothian cyst.  As read by Radiology.    US Abdomen Limited  1.  Common bile duct is dilated up to 1.0 cm and there is a possible but not definitive distal common bile duct stone. Given these findings, recommend further evaluation with MRCP.     2.  Multiple gallstones as well as sludge in the gallbladder. No definite gallbladder wall thickening and sonographic Jasso sign is negative. However, there is a small amount of pericholecystic fluid. Ultimately, findings are somewhat equivocal but felt   to be less likely due to cholecystitis at this time. If patient's symptoms persist or progress, follow-up repeat ultrasound would be helpful in further evaluation.     3.  Remainder of the right upper quadrant ultrasound is negative.  As read by  Radiology.    Lipase: 164    CMP: Chloride 113(H), (H), (H), o/w WNL (Creatinine 0.97)  CBC: WBC 13.1(H), o/w WNL (HGB 13.0, )    ED course:  1925 I evaluated the patient in conjunction with Paula Carlisle PA-C.  1947 NS 1L IV Bolus  1948 Zofran 4 mg IV  1948 Dilaudid 0.5 mg IV  2007 Omnipaque 25 mL PO  2008 Omnipaque 25 mL PO  2130 Dilaudid 0.5 mg IV    My impression is she demonstrates R sided abdominal tenderness. This is complicated by her recent gastric sleeve. CT imaging was initially obtained to evaluate for complication of her bariatric surgery as well as potential biliary tree pathology as this is highest on the differential. Her diagnostic labs are not most c/w choledocholithiasis. Her CT imaging is reassuring in the area of the gastric sleeve however there are findings of CBD dilatation. US imaging was obtained which again demonstrates this as well as possible ductal stone. There are also equivocal findings of cholecystitis but no Jasso sign and repeat US to re-evaluate for potential changes indicative of cholecystitis were recommended. Given her findings, needed for MRCP/ERCP and potential repeat US, plan for admission.    Discussion  By Paula with Dr. Chin who accepts for admission.     Diagnosis    ICD-10-CM    1. Choledocholithiasis  K80.50      Silas Lopez PA-C    Scribe Disclosure:  I, Becca Carmona, am serving as a scribe at 7:46 PM on 1/14/2021 to document services personally performed by Silas Lopez PA-C based on my observations and the provider's statements to me.            Silas Lopez PA-C  01/14/21 5848

## 2021-01-15 NOTE — PROGRESS NOTES
"[facility]  Hospitalist Progress Note  Deric Sosa MD, MD 01/15/2021  (Text Page)  Reason for Stay (Diagnosis): Abdominal pain         Assessment and Plan:      Summary of Stay: Lisa Betancourt is a 50 year old pleasant lady, works as an RN, on Humira and methotrexate for RA, presented due to abdominal pain and vomiting spells and was  admitted on 1/14/2021.    Problem List:   1. Suspected choledocholithiasis  2. Cholelithiasis  3. History of longstanding RA on immunosuppressants with Humira and methotrexate    Continue inpatient care.  Currently receiving IV Unasyn.  Highly appreciate prompt input from GI and surgical service  Plans for EUS, possible ERCP this afternoon  Surgery service planning to pursue laparoscopic cholecystectomy as well  Remain on n.p.o.  As needed antiemetics, resumption of her levothyroxine and Norvasc.  IV fluid support.      DVT Prophylaxis: Pneumatic Compression Devices  Code Status: Full Code  Discharge Dispo: home  Estimated Disch Date / # of Days until Disch: 1 day        Interval History (Subjective):      Assume hospitalist service care today.  Seen and examined.  Chart reviewed.  Ms. Gonzalez is feeling fine and endorsing no nausea or vomiting but still with some right-sided abdominal discomfort.  Afebrile.  Mental state at baseline.  Stable hemodynamics.  No bleeding tendencies.              Physical Exam:      Last Vital Signs:  /59 (BP Location: Left arm)   Pulse 59   Temp 98.6  F (37  C) (Oral)   Resp 16   Ht 1.676 m (5' 6\")   Wt 84.5 kg (186 lb 3.2 oz)   SpO2 97%   Breastfeeding No   BMI 30.05 kg/m      I/O last 3 completed shifts:  In: 1000 [IV Piggyback:1000]  Out: -   Wt Readings from Last 1 Encounters:   01/15/21 84.5 kg (186 lb 3.2 oz)     Vitals:    01/14/21 1919 01/15/21 0128   Weight: 77.1 kg (170 lb) 84.5 kg (186 lb 3.2 oz)       Constitutional: Awake, alert, cooperative, no apparent distress   Respiratory: Clear to auscultation " bilaterally, no crackles or wheezing   Cardiovascular: Regular rate and rhythm, normal S1 and S2, and no murmur noted   Abdomen: Normal bowel sounds, soft, non-distended, minimal tender at right upper quadrant   Skin: No rashes, no cyanosis, dry to touch   Neuro: Alert and oriented x3, no weakness, spontaneous and coherent speech   Extremities: No edema, normal range of motion   Other(s): Euthymic mood, not agitated       All other systems: Negative          Medications:      All current medications were reviewed with changes reflected in problem list.         Data:      All new lab and imaging data was reviewed.   Labs:  Recent Labs   Lab 01/15/21  0103 01/15/21  0054   CULT No growth after 3 hours No growth after 3 hours     No results for input(s): PH, PHARTERIAL, PO2, [id number], SAT, PCO2, HCO3, BASEEXCESS, AKIRA, BEB in the last 168 hours.    Invalid input(s): [id number]  Recent Labs   Lab 01/15/21  1011 01/14/21  1939   WBC  --  13.1*   HGB  --  13.0   HCT  --  39.2   MCV  --  98    268     Recent Labs   Lab 01/14/21  1939      POTASSIUM 3.7   CHLORIDE 113*   CO2 24   ANIONGAP 4   GLC 97   BUN 16   CR 0.97   GFRESTIMATED 69   GFRESTBLACK 80   MYRA 8.5   PROTTOTAL 7.3   ALBUMIN 3.5   BILITOTAL 0.3   ALKPHOS 86   *   *     No results for input(s): SED, CRP in the last 168 hours.  Recent Labs   Lab 01/14/21  1939   GLC 97     Recent Labs   Lab 01/15/21  1011   INR 1.11     No results for input(s): TROPONIN, TROPI, TROPR in the last 168 hours.    Invalid input(s): TROP, TROPONINIES   Imaging:   Results for orders placed or performed during the hospital encounter of 01/14/21   CT Abdomen Pelvis w Contrast    Narrative    EXAM: CT ABDOMEN PELVIS W CONTRAST  LOCATION: [facility]  DATE/TIME: 1/14/2021 8:45 PM    INDICATION: Abdominal pain and vomiting. Gastric sleeve procedure one year ago.  COMPARISON: CT abdomen pelvis 03/26/2020  TECHNIQUE: CT scan of the abdomen and pelvis  was performed following injection of IV contrast. Multiplanar reformats were obtained. Dose reduction techniques were used.  CONTRAST: 85 mL Isovue-370    FINDINGS:   LOWER CHEST: Scarring or atelectasis right lower lobe medially adjacent to the spine, unchanged. Small esophageal hiatal hernia.    HEPATOBILIARY: The liver is unremarkable. Moderate distention of the gallbladder. The common bile duct is dilated measuring 10 mm. No evidence for distal obstructing stone or mass.    PANCREAS: Normal.    SPLEEN: Normal.    ADRENAL GLANDS: Normal.    KIDNEYS/BLADDER: Normal.    BOWEL: Gastric sleeve procedure. Moderate gastric distention. Numerous slightly distended loops of small bowel within the abdomen and pelvis, but no findings for obstruction. Colon is of normal caliber. No inflammatory changes.    LYMPH NODES: Normal.    VASCULATURE: Unremarkable.    PELVIC ORGANS: 3 cm right adnexal cyst and trace free pelvic fluid. Cervical nabothian cyst.    MUSCULOSKELETAL: Degenerative disc and facet disease L5 level.      Impression    IMPRESSION:   1.  Gastric sleeve procedure, with moderate gastric distention.  2.  Dilated common bile duct is more prominent compared to the previous exam. No obvious obstructing lesion. Correlation with liver function tests recommended.  3.  Numerous slightly distended loops of small bowel within the left abdomen and pelvis, nonspecific. No inflammatory changes or other evidence for obstruction.   4.  Right adnexal cyst, presumed physiologic. Please see guidelines below for follow-up.  5.  Cervical nabothian cyst.    REFERENCE:  Guidelines for incidental benign or probably benign adnexal cyst on CT/MRI.  Managing Incidental Findings on Abdominal and Pelvic CT and MRI, Part 1: White Paper of the ACR Incidental Findings Committee II on Adnexal Findings. J Am Elizabeth Radiol 2013;10:675-681.    Premenopausal:  Less than 5 cm: Benign, no follow up.    Greater than 5 cm: US follow up in 8 weeks.        US Abdomen Limited    Narrative    EXAM: US ABDOMEN LIMITED  LOCATION: [facility]  DATE/TIME: 1/14/2021 10:30 PM    INDICATION: Right upper quadrant pain and vomiting.  COMPARISON: CT from 1/14/2021.  TECHNIQUE: Limited abdominal ultrasound.    FINDINGS:    GALLBLADDER: Multiple small stones in the gallbladder as well as sludge material. No gallbladder wall thickening. Sonographic Jasso sign is negative. Small amount of pericholecystic fluid.    BILE DUCTS: No common bile duct is dilated up to 1.0 cm. There is limited visualization of the distal common bile duct, however, there is questionable possible distal common bile duct stone although not well seen. MRCP is recommended in further   evaluation. Mild intrahepatic biliary dilatation is also present.    LIVER: Normal parenchyma with smooth contour. No focal mass.    RIGHT KIDNEY: No hydronephrosis.    PANCREAS: The visualized portions are normal.    No ascites.      Impression    IMPRESSION:  1.  Common bile duct is dilated up to 1.0 cm and there is a possible but not definitive distal common bile duct stone. Given these findings, recommend further evaluation with MRCP.    2.  Multiple gallstones as well as sludge in the gallbladder. No definite gallbladder wall thickening and sonographic Jasso sign is negative. However, there is a small amount of pericholecystic fluid. Ultimately, findings are somewhat equivocal but felt   to be less likely due to cholecystitis at this time. If patient's symptoms persist or progress, follow-up repeat ultrasound would be helpful in further evaluation.    3.  Remainder of the right upper quadrant ultrasound is negative.

## 2021-01-15 NOTE — ED TRIAGE NOTES
Pt presents for evaluation of right sided abdominal with vomiting starting after dinner tonight. Pt ate fish and mac and cheese. Pt feels it may be here gallbladder. Hx of gastric sleeve. Pain radiated up to epigastrium. Took gas-x, with no relief. Persistent nausea. Denies any urinary issues.

## 2021-01-15 NOTE — ED PROVIDER NOTES
History   Chief Complaint:  Abdominal Pain       HPI   Lisa Betancourt is a 50 year old female with history of hypothyroidism, hypothyroidism, and renal disease who presents with 8/10 abdominal pain and bloating with associated nausea and one episode of non-bloody vomiting one hour prior to evaluation. The patient states the symptoms onset suddenly about 0.5 hours after eating one cup of fish and mac-n-cheese for dinner. She did take Gas-X but is unsure if she vomited it up. She denies any diarrhea, blood in stools, blood in vomit, chest pain, shortness of breath, fever, or chills. Notably, she did have gastric sleeve surgery last year and has never had any issues with it. She has never experienced abdominal pain similar to this. She last followed up with her surgeon in August and is scheduled for an appointment in four days.    Review of Systems   Constitutional: Negative for chills and fever.   Respiratory: Negative for shortness of breath.    Cardiovascular: Negative for chest pain.   Gastrointestinal: Positive for abdominal pain, nausea and vomiting. Negative for blood in stool and diarrhea.   All other systems reviewed and are negative.    Allergies:  The patient has no known allergies.     Medications:  Lasix  Humira  Norvasc  Flonase  Synthroid   Methotrexate  Oxybutynin  Maxalt     Past Medical History:    Rheumatoid arthritis  Hypothyroidism  Renal disease    Ureterolithiasis     Past Surgical History:    Left breast lumpectomy  C section  Insert ureters x2  Hernia repair  Tubal ligation   Gastric Sleeve  tonsillectomy and adenoidectomy  [place] teeth     Family History:    Breast cancer - mother    Social History:  The patient presented alone to the ED.    Physical Exam     Patient Vitals for the past 24 hrs:   BP Temp Temp src Pulse Resp SpO2 Height Weight   01/14/21 2300 109/64 -- -- -- -- -- -- --   01/14/21 2200 108/64 -- -- 60 -- 96 % -- --   01/14/21 2130 116/75 -- -- 68 -- 100 % -- --   01/14/21 2030  "122/70 -- -- 57 -- 100 % -- --   01/14/21 2015 -- -- -- -- -- 99 % -- --   01/14/21 2000 113/69 -- -- 56 -- 98 % -- --   01/14/21 1945 113/72 -- -- -- -- -- -- --   01/14/21 1919 (!) 160/107 98.4  F (36.9  C) Oral 89 18 99 % 1.676 m (5' 6\") 77.1 kg (170 lb)       Physical Exam  Vitals signs and nursing note reviewed.   HENT:      Nose: Nose normal. No congestion or rhinorrhea.     Mouth/Throat:      Mouth: Mucous membranes are moist.   Eyes:      General: No scleral icterus.     Extraocular Movements: Extraocular movements intact.      Conjunctiva/sclera: Conjunctivae normal.   Cardiovascular:      Rate and Rhythm: Regular rhythm. Normal Rate.     Pulses: Normal pulses.      Heart sounds: Normal heart sounds.   Pulmonary:      Effort: Pulmonary effort is normal.      Breath sounds: Normal breath sounds.   Abdominal:      General: Abdomen is flat. Bowel sounds are normal.      Palpations: Abdomen is soft.      Tenderness: RUQ abdominal pain with palpation.   Musculoskeletal: Normal range of motion.      Right lower leg: No edema.      Left lower leg: No edema.   Skin:     General: Skin is warm and dry.   Neurological:      Mental Status: Responds appropriately to questions.   Psychiatric:         Mood and Affect: Mood normal.         Behavior: Behavior normal.     Emergency Department Course     Imaging:  CT Abdomen Pelvis w Contrast  IMPRESSION:   1.  Gastric sleeve procedure, with moderate gastric distention.  2.  Dilated common bile duct is more prominent compared to the previous exam. No obvious obstructing lesion. Correlation with liver function tests recommended.  3.  Numerous slightly distended loops of small bowel within the left abdomen and pelvis, nonspecific. No inflammatory changes or other evidence for obstruction.   4.  Right adnexal cyst, presumed physiologic. Please see guidelines below for follow-up.  5.  Cervical nabothian cyst.  As read by Radiology.    US Abdomen Limited  IMPRESSION:  1.  Common " bile duct is dilated up to 1.0 cm and there is a possible but not definitive distal common bile duct stone. Given these findings, recommend further evaluation with MRCP.   2.  Multiple gallstones as well as sludge in the gallbladder. No definite gallbladder wall thickening and sonographic Jasso sign is negative. However, there is a small amount of pericholecystic fluid. Ultimately, findings are somewhat equivocal but felt   to be less likely due to cholecystitis at this time. If patient's symptoms persist or progress, follow-up repeat ultrasound would be helpful in further evaluation.   3.  Remainder of the right upper quadrant ultrasound is negative.  As read by Radiology.    Laboratory:  Lipase: 164    CMP: Chloride 113(H), (H), (H), o/w WNL (Creatinine 0.97)  CBC: WBC 13.1(H), o/w WNL (HGB 13.0, )     Emergency Department Course:    Reviewed:  I reviewed nursing notes, vitals and past medical history    Assessments:  1925 I obtained history and examined the patient as noted above.   2124 I rechecked the patient and explained findings. The patient states she is still experiencing some discomfort.   2330   I rechecked the patient and discussed the results of her labs and imaging. We discussed the plan for admission for further testing and evaluation. All questions and concerns were addressed.     Consults:   11:50 I discussed the patient with Dr. Chin who graciously accepted the patient for admission to med/surg.     Interventions:  1947 NS 1L IV Bolus  1948 Zofran 4 mg IV  1948 Dilaudid 0.5 mg IV  2007 Omnipaque 25 mL PO  2008 Omnipaque 25 mL PO  2130 Dilaudid 0.5 mg IV     Disposition:  The patient was admitted to the hospital under the care of Dr. Chin.       Impression & Plan   Medical Decision Making:  Lisa Betancourt is a 50 year old female who presents to the ED for the evaluation of RUQ abdominal pain as outlined in the HPI. Vitals were reviewed-patient afebrile and not tachycardiac.  Labs were obtained and reviewed. Evidence of leukocytosis. AST and ALT elevated. Bilirubin normal. Lipase normal.  CT abdomen pelvis was obtained to rule out complication of her bariatric surgery as well as biliary tree pathology. CT revealed dilated common bile duct.  RUQ ultrasound revealed common bile duct dilation as well as possible ductal stone. There was also mention of equivocal findings of cholecystitis however there is no Jasso sign. Repeat US was advised. Results including incidental findings were discussed with the patient. Given above findings, MRCP/ERCP as well as repeat US were advised. The patient will be admitted for further care and evaluation. Case was discussed with Dr. Chin of the hospitalist service who graciously accepted the patient for admission. All questions and concerns were addressed prior to admission.     Covid-19  Lisa Betancourt was evaluated during a global COVID-19 pandemic, which necessitated consideration that the patient might be at risk for infection with the SARS-CoV-2 virus that causes COVID-19.   Applicable protocols for evaluation were followed during the patient's care.   COVID-19 was considered as part of the patient's evaluation. The plan for testing is:  a test was obtained during this visit.    Diagnosis:    ICD-10-CM    1. Choledocholithiasis  K80.50    2. Abnormal ultrasound  R93.89    3. Acute abdominal pain  R10.9    4. Adnexal cyst  N94.9        Discharge Medications:  New Prescriptions    No medications on file       Scribe Disclosure:  Becca WELLS, am serving as a scribe at 7:22 PM on 1/14/2021 to document services personally performed by Paula Carlisle PA-C based on my observations and the provider's statements to me.          Paula Carlisle PA-C  01/14/21 2710

## 2021-01-15 NOTE — PLAN OF CARE
Pertinent assessments: A&Ox4, pleasant and cooperative. Up independently. Bowel sounds normoactive. LS clear. C/o RUQ pain, being managed with dilaudid. Will occasionally c/o nausea, managed with zofran. On IVF.   Major Shift Events: New admission.  Treatment Plan: GI & surgery consults, NPO per anesthesia guidelines. IVF, IV Unasyn, pain management.

## 2021-01-15 NOTE — CONSULTS
GASTROENTEROLOGY CONSULTATION      Lisa Betancourt  [address] [facility] [place] [address]  50 year old female     Admission Date/Time: 1/14/2021  Primary Care Provider: Jessica Mcgrath     We were asked to see the patient in consultation by Dr. Chin for evaluation of right upper quadrant abdominal pain.    CC: right upper quadrant pain     HPI:  Lisa Betancourt is a 50 year old female with past medical history significant for rheumatoid arthritis on immunosuppressive medications with Humira/methotrexate, s/p gastric sleeve 11/2019 admitted 1/14 with symptoms of acute onset right upper quadrant abdominal pain.     Patient had acute onset of right upper quadrant abdominal pain, nausea, and vomiting after eating dinner last evening. The pain did not improve after vomiting and actually worsened prompting her to come to the hospital. She had some radiation of the pain to her back. Denies any associated fevers, chills, melena, hematochezia, light colored stools, or dark urine. No history of similar pain in the past.     Labs showed elevated  and  with normal total bilirubin and alk phos. Lipase normal. WBC elevated 13.1     CT abd/pelvis showed findings of gastric sleeve, distended gall bladder, and common bile duct 10mm without any definitive gall stones.     RUQ US showed gall stones within the gall bladder along with sludge, and dilated common bile duct measuring 1cm with possible distal common bile duct stone. Small amount of pericholecystic fluid but no gall bladder wall thickening.        PAST MEDICAL HISTORY:  Patient Active Problem List    Diagnosis Date Noted     Choledocholithiasis 01/14/2021     Priority: Medium     Acute abdominal pain 01/14/2021     Priority: Medium     Adnexal cyst 01/14/2021     Priority: Medium     Abnormal ultrasound 01/14/2021     Priority: Medium     Renal calculus, left 05/06/2020     Priority: Medium     Added automatically from request for surgery [id number]        Ureterolithiasis 03/26/2020     Priority: Medium     Left ureteral calculus 03/26/2020     Priority: Medium     Added automatically from request for surgery [id number]       Rheumatoid arthritis (H)      Priority: Medium     Problem list name updated by automated process. Provider to review       Hypothyroid      Priority: Medium          ROS: A comprehensive ten point review of systems was negative aside from those in mentioned in the HPI.       MEDICATIONS:   Prior to Admission medications    Medication Sig Start Date End Date Taking? Authorizing Provider   acetaminophen (TYLENOL) 500 MG tablet Take 500 mg by mouth as needed for other (migraine) At the onset of migraine with Rizatriptan    Unknown, Entered By History   adalimumab (HUMIRA) 40 MG/0.8ML syr kit Inject 40 mg Subcutaneous every 14 days    Reported, Patient   amLODIPine (NORVASC) 10 MG tablet Take 10 mg by mouth daily    Unknown, Entered By History   biotin 1000 MCG TABS tablet Take 1,000 mcg by mouth daily    Unknown, Entered By History   calcium citrate (CITRACAL) 950 MG tablet Take 2 tablets by mouth 2 times daily    Unknown, Entered By History   cetirizine (ZYRTEC) 10 MG tablet Take 10 mg by mouth daily    Unknown, Entered By History   cyanocobalamin (VITAMIN B-12) 1000 MCG tablet Take 1,000 mcg by mouth once a week    Unknown, Entered By History   fluticasone (FLONASE) 50 MCG/ACT nasal spray Spray 2 sprays into both nostrils daily as needed for rhinitis or allergies    Unknown, Entered By History   folic acid (FOLVITE) 1 MG tablet Take 1 mg by mouth daily    Unknown, Entered By History   furosemide (LASIX) 10 mg TABS half-tab Take 10 mg by mouth daily as needed (swelling in feet)    Unknown, Entered By History   Levothyroxine Sodium (SYNTHROID PO) Take 175 mcg by mouth daily     Reported, Patient   methotrexate 2.5 MG tablet Take 12.5 mg by mouth once a week    Unknown, Entered By History   multivitamin w/minerals (MULTI-VITAMIN) tablet Take 1 tablet  "by mouth daily    Unknown, Entered By History   oxybutynin (DITROPAN) 5 MG tablet Take 5 mg by mouth 2 times daily    Reported, Patient   rizatriptan (MAXALT) 10 MG tablet Take 10 mg by mouth at onset of headache for migraine Repeat Q2H PRN x 2 more doses. Max 30 mg in 24 hours    Unknown, Entered By History   Vitamin D, Cholecalciferol, 25 MCG (1000 UT) TABS Take 2,000 Units by mouth daily    Unknown, Entered By History        ALLERGIES: No Known Allergies     SOCIAL HISTORY:  Social History     Tobacco Use     Smoking status: Never Smoker     Smokeless tobacco: Never Used   Substance Use Topics     Alcohol use: Not Currently     Alcohol/week: 0.0 standard drinks     Frequency: Never     Drug use: Never        FAMILY HISTORY:  No family history on file.     PHYSICAL EXAM:   /59 (BP Location: Left arm)   Pulse 59   Temp 98.6  F (37  C) (Oral)   Resp 16   Ht 1.676 m (5' 6\")   Wt 84.5 kg (186 lb 3.2 oz)   SpO2 97%   Breastfeeding No   BMI 30.05 kg/m       PHYSICAL EXAM:  General: alert, oriented, NAD  SKIN: no suspicious lesions, rashes, jaundice, or spider angiomas  HEAD: Normocephalic. No masses, lesions, tenderness or abnormalities  NECK: Neck supple. No adenopathy. Thyroid symmetric, normal size.  EYES: No scleral icterus  ENT: ENT exam normal, no neck nodes or sinus tenderness  RESPIRATORY: negative, Good diaphragmatic excursion. Lungs clear  CARDIOVASCULAR: negative, PMI normal. No lifts, heaves, or thrills. RRR. No murmurs, clicks gallops or rub  GASTROINTESTINAL: +BS, soft, NT, ND, no HSM, no masses/guarding/rebound  JOINT/EXTREMITIES: extremities normal- no gross deformities noted, gait normal and normal muscle tone  NEURO: Reflexes grossly normal and symmetric. Sensation grossly WNL.  PSYCH: no abnormal anxiety/depression  LYMPH: No anterior cervical, posterior cervical, or supraclavicular adenopathy     LABS:  I reviewed the patient's new clinical lab test results.   Recent Labs   Lab Test " 01/14/21 1939 03/28/20  0629 03/27/20  0620   WBC 13.1* 10.6 14.2*   HGB 13.0 11.1* 11.7   MCV 98 96 98    200 201     Recent Labs   Lab Test 01/14/21 1939 05/08/20 03/28/20  0629 03/27/20  0620     --  141 140   POTASSIUM 3.7 4.5 4.3 4.5   CHLORIDE 113*  --  113* 113*   CO2 24  --  25 24   BUN 16  --  15 16   ANIONGAP 4  --  3 3   MYRA 8.5  --  8.2* 8.4*     Recent Labs   Lab Test 01/14/21 1939 03/26/20  0051   ALBUMIN 3.5  --    BILITOTAL 0.3  --    *  --    *  --    ALKPHOS 86  --    PROTEIN  --  10*   LIPASE 164  --         IMAGING  I personally reviewed the patient's new imaging results.  EXAM: CT ABDOMEN PELVIS W CONTRAST  LOCATION: [facility]  DATE/TIME: 1/14/2021 8:45 PM     INDICATION: Abdominal pain and vomiting. Gastric sleeve procedure one year ago.  COMPARISON: CT abdomen pelvis 03/26/2020  TECHNIQUE: CT scan of the abdomen and pelvis was performed following injection of IV contrast. Multiplanar reformats were obtained. Dose reduction techniques were used.  CONTRAST: 85 mL Isovue-370     FINDINGS:   LOWER CHEST: Scarring or atelectasis right lower lobe medially adjacent to the spine, unchanged. Small esophageal hiatal hernia.     HEPATOBILIARY: The liver is unremarkable. Moderate distention of the gallbladder. The common bile duct is dilated measuring 10 mm. No evidence for distal obstructing stone or mass.     PANCREAS: Normal.     SPLEEN: Normal.     ADRENAL GLANDS: Normal.     KIDNEYS/BLADDER: Normal.     BOWEL: Gastric sleeve procedure. Moderate gastric distention. Numerous slightly distended loops of small bowel within the abdomen and pelvis, but no findings for obstruction. Colon is of normal caliber. No inflammatory changes.     LYMPH NODES: Normal.     VASCULATURE: Unremarkable.     PELVIC ORGANS: 3 cm right adnexal cyst and trace free pelvic fluid. Cervical nabothian cyst.     MUSCULOSKELETAL: Degenerative disc and facet disease L5 level.                                                                       IMPRESSION:   1.  Gastric sleeve procedure, with moderate gastric distention.  2.  Dilated common bile duct is more prominent compared to the previous exam. No obvious obstructing lesion. Correlation with liver function tests recommended.  3.  Numerous slightly distended loops of small bowel within the left abdomen and pelvis, nonspecific. No inflammatory changes or other evidence for obstruction.   4.  Right adnexal cyst, presumed physiologic. Please see guidelines below for follow-up.  5.  Cervical nabothian cyst.    EXAM: US ABDOMEN LIMITED  LOCATION: [facility]  DATE/TIME: 1/14/2021 10:30 PM     INDICATION: Right upper quadrant pain and vomiting.  COMPARISON: CT from 1/14/2021.  TECHNIQUE: Limited abdominal ultrasound.     FINDINGS:     GALLBLADDER: Multiple small stones in the gallbladder as well as sludge material. No gallbladder wall thickening. Sonographic Jasso sign is negative. Small amount of pericholecystic fluid.     BILE DUCTS: No common bile duct is dilated up to 1.0 cm. There is limited visualization of the distal common bile duct, however, there is questionable possible distal common bile duct stone although not well seen. MRCP is recommended in further   evaluation. Mild intrahepatic biliary dilatation is also present.     LIVER: Normal parenchyma with smooth contour. No focal mass.     RIGHT KIDNEY: No hydronephrosis.     PANCREAS: The visualized portions are normal.     No ascites.                                                                      IMPRESSION:  1.  Common bile duct is dilated up to 1.0 cm and there is a possible but not definitive distal common bile duct stone. Given these findings, recommend further evaluation with MRCP.     2.  Multiple gallstones as well as sludge in the gallbladder. No definite gallbladder wall thickening and sonographic Jasso sign is negative. However, there is a small amount of  pericholecystic fluid. Ultimately, findings are somewhat equivocal but felt   to be less likely due to cholecystitis at this time. If patient's symptoms persist or progress, follow-up repeat ultrasound would be helpful in further evaluation.     3.  Remainder of the right upper quadrant ultrasound is negative.       CONSULTATION ASSESSMENT AND PLAN:    50 year old female with past medical history significant for rheumatoid arthritis on immunosuppressive medications with Humira/methotrexate, s/p gastric sleeve 11/2019 admitted 1/14 with symptoms of acute onset right upper quadrant abdominal pain. Labs showed elevated ALT, AST, and leukocytosis with CT abd/pelvis showing dilated common bile duct and distended gall bladder, RUQ US showing similar findings along with common bile duct measuring 1cm with possible distal obstructing stone, gall stones and sludge within the gall bladder.     Of note, patient is a nurse at the [employer]. She has received her COVID vaccine recently. COVID PCR negative today.     1. RUQ abdominal pain. Most likely secondary to choledocholithiasis with dilated CBD. However, typically alk phos and total bilirubin would be elevated in this scenario. She has not had a fever and does not appear septic therefore cholangitis unlikely at this point. She is on empiric IV antibiotics.   --Discussed patient with our biliary staff Dr. Odonnell and Dr. Browne with general surgery.   --Plan on EUS, possible ERCP at 1:30pm today at [facility].   --Keep NPO.     Thank you for asking us to participate in the care of this patient.    MARIALUISA Edward  [facility] ([facility])        Addendum: Dr. Odonnell informed me that he does not do EUS. Procedure today will be ERCP only.    [facility] staff addendum    Pt seen and examined in conjunction with Joy Silveira PA-C. Pt with acute onset RUQ pain, mild leukocytosis, and elevated LFTs. US notable for gallstones, dilated CBD to 10 mm, and possible CBD stone. Pt started on  empiric Abx. Pt has very high likelihood of choledocholithiasis. Will plan ERCP prior to cholecystectomy. Risks, benefits, alternative discussed and she agrees to proceed with ERCP.    Kana Odonnell MD

## 2021-01-15 NOTE — CONSULTS
General Surgery Consultation    Lisa Betancourt MRN# [id number]   Age: 50 year old YOB: 1970     Date of Admission:  1/14/2021    Reason for consult:            Abdominal pain, right upper quadrant       Requesting physician:            Tavon Chin MD                Assessment and Plan:   Assessment:   Lisa Betancourt is a 50 year old female with gallstones and possible choledocholithiasis (common bile duct dilation to 10 mm, with normal LFTs)        Plan:   Patient discussed with GI team. Will have EUS/ERCP today and likely lap val tomorrow. Trying to arrange for a combined case today, but the OR does not currently have availability.     We have discussed the indication, alternatives, risks and expected recovery.  Specifically we have discussed incisions, scarring, postoperative infections, anesthesia, bleeding, blood transfusion, open conversion, common bile duct injury, injury to intra-abdominal organs, adhesions leading to bowel obstruction, retained common bile duct stone, bile leak, DVT, PE, hernia, post cholecystectomy diarrhea, recovery, postoperative dietary restrictions and physical limitations.  We have discussed the recommended interventions and treatments for these complications.  All questions have been answered to the best of my ability.    She elects to proceed with surgery.             Chief Complaint:   Abdominal pain, right upper quadrant     History is obtained from the patient.         History of Present Illness:   Lisa Betancourt is a 50 year old female with history of a sleeve gastrectomy 1 year ago who presents with right upper quadrant abdominal pain for the past 12 hours.  The pain is constant.  She has had 1 episode of similar pain in the past a few months ago.  The pain started shortly after dinner last night.  Positive for associated symptoms of nausea and vomiting.  She  does not have a history of jaundice or dark urine.  She  has not had pancreatitis in the past.               Past Medical History:    has a past medical history of Hypothyroid, Renal disease, and Rheumatoid arthritis(714.0).          Past Surgical History:     Past Surgical History:   Procedure Laterality Date     BREAST LUMPECTOMY, RT/LT      Breast Lumpectomy LT     C/SECTION, LOW TRANSVERSE      , Low Transverse     COMBINED CYSTOSCOPY, INSERT STENT URETER(S) Left 3/26/2020    Procedure: Video cystoscopy, left double-J stent placement (5-French x 24 cm);  Surgeon: Torres Ware MD;  Location: [facility] OR     HERNIA REPAIR, UMBILICAL  2002     LASER HOLMIUM LITHOTRIPSY URETER(S), INSERT STENT, COMBINED Left 2020    Procedure: Video cystoscopy, left double-J stent removal, flexible left ureteroscopy and rhinoscopy with stone extraction. (laser on standby only) ;  Surgeon: Torres Ware MD;  Location: [facility] OR     TUBAL LIGATION               Social History:     Social History     Tobacco Use     Smoking status: Never Smoker     Smokeless tobacco: Never Used   Substance Use Topics     Alcohol use: Not Currently     Alcohol/week: 0.0 standard drinks     Frequency: Never             Family History:   Family history reviewed and is not pertinent.         Allergies:   No Known Allergies          Medications:   No current facility-administered medications on file prior to encounter.        acetaminophen (TYLENOL) 500 MG tablet, Take 500 mg by mouth as needed for other (migraine) At the onset of migraine with Rizatriptan       adalimumab (HUMIRA) 40 MG/0.8ML syr kit, Inject 40 mg Subcutaneous every 14 days       amLODIPine (NORVASC) 10 MG tablet, Take 10 mg by mouth daily       biotin 1000 MCG TABS tablet, Take 1,000 mcg by mouth daily       calcium citrate (CITRACAL) 950 MG tablet, Take 2 tablets by mouth 2 times daily       cetirizine (ZYRTEC) 10 MG tablet, Take 10 mg by mouth daily       cyanocobalamin (VITAMIN B-12) 1000 MCG tablet, Take 1,000 mcg by mouth once a week       fluticasone (FLONASE) 50 MCG/ACT  "nasal spray, Spray 2 sprays into both nostrils daily as needed for rhinitis or allergies       folic acid (FOLVITE) 1 MG tablet, Take 1 mg by mouth daily       furosemide (LASIX) 10 mg TABS half-tab, Take 10 mg by mouth daily as needed (swelling in feet)       Levothyroxine Sodium (SYNTHROID PO), Take 175 mcg by mouth daily        methotrexate 2.5 MG tablet, Take 12.5 mg by mouth once a week       multivitamin w/minerals (MULTI-VITAMIN) tablet, Take 1 tablet by mouth daily       oxybutynin (DITROPAN) 5 MG tablet, Take 5 mg by mouth 2 times daily       rizatriptan (MAXALT) 10 MG tablet, Take 10 mg by mouth at onset of headache for migraine Repeat Q2H PRN x 2 more doses. Max 30 mg in 24 hours       Vitamin D, Cholecalciferol, 25 MCG (1000 UT) TABS, Take 2,000 Units by mouth daily        amLODIPine  10 mg Oral Daily     ampicillin-sulbactam (UNASYN) IV  3 g Intravenous Q6H     levothyroxine  175 mcg Oral Daily     oxybutynin  5 mg Oral BID     sodium chloride (PF)  3 mL Intracatheter Q8H            Review of Systems:   The 10 point review of systems is negative other than noted in the HPI.          Physical Exam:   /59 (BP Location: Left arm)   Pulse 59   Temp 98.6  F (37  C) (Oral)   Resp 16   Ht 1.676 m (5' 6\")   Wt 84.5 kg (186 lb 3.2 oz)   SpO2 97%   Breastfeeding No   BMI 30.05 kg/m    General - Well developed, well nourished female in no apparent distress  HEENT:  Head normocephalic and atraumatic, pupils equal and round, conjunctivae clear, no scleral icterus, mucous membranes moist, external ears and nose normal  Neck: Supple without thyromegaly or masses  Lymphatic: No cervical, or supraclavicular lymphadenopathy  Lungs: Clear to auscultation bilaterally  Heart: regular rate and rhythm, no murmurs  Abdomen:   soft, flat, non-distended with mild tenderness noted in the right upper quadrant . no masses palpated  Extremities: Warm without edema  Neurologic: nonfocal  Psychiatric: Mood and affect " appropriate  Skin: Without lesions, rashes, or juandice         Data:     WBC -   Lab Results   Component Value Date    WBC 13.1 (H) 01/14/2021       HgB -   Lab Results   Component Value Date    HGB 13.0 01/14/2021       Plt-   Lab Results   Component Value Date     01/14/2021       Liver Function Studies -   Recent Labs   Lab Test 01/14/21  1939   PROTTOTAL 7.3   ALBUMIN 3.5   BILITOTAL 0.3   ALKPHOS 86   *   *       Lipase-   Lab Results   Component Value Date    LIPASE 164 01/14/2021         Imaging:  All imaging studies reviewed by me.    Results for orders placed or performed during the hospital encounter of 01/14/21   CT Abdomen Pelvis w Contrast    Narrative    EXAM: CT ABDOMEN PELVIS W CONTRAST  LOCATION: [facility]  DATE/TIME: 1/14/2021 8:45 PM    INDICATION: Abdominal pain and vomiting. Gastric sleeve procedure one year ago.  COMPARISON: CT abdomen pelvis 03/26/2020  TECHNIQUE: CT scan of the abdomen and pelvis was performed following injection of IV contrast. Multiplanar reformats were obtained. Dose reduction techniques were used.  CONTRAST: 85 mL Isovue-370    FINDINGS:   LOWER CHEST: Scarring or atelectasis right lower lobe medially adjacent to the spine, unchanged. Small esophageal hiatal hernia.    HEPATOBILIARY: The liver is unremarkable. Moderate distention of the gallbladder. The common bile duct is dilated measuring 10 mm. No evidence for distal obstructing stone or mass.    PANCREAS: Normal.    SPLEEN: Normal.    ADRENAL GLANDS: Normal.    KIDNEYS/BLADDER: Normal.    BOWEL: Gastric sleeve procedure. Moderate gastric distention. Numerous slightly distended loops of small bowel within the abdomen and pelvis, but no findings for obstruction. Colon is of normal caliber. No inflammatory changes.    LYMPH NODES: Normal.    VASCULATURE: Unremarkable.    PELVIC ORGANS: 3 cm right adnexal cyst and trace free pelvic fluid. Cervical nabothian cyst.    MUSCULOSKELETAL:  Degenerative disc and facet disease L5 level.      Impression    IMPRESSION:   1.  Gastric sleeve procedure, with moderate gastric distention.  2.  Dilated common bile duct is more prominent compared to the previous exam. No obvious obstructing lesion. Correlation with liver function tests recommended.  3.  Numerous slightly distended loops of small bowel within the left abdomen and pelvis, nonspecific. No inflammatory changes or other evidence for obstruction.   4.  Right adnexal cyst, presumed physiologic. Please see guidelines below for follow-up.  5.  Cervical nabothian cyst.    REFERENCE:  Guidelines for incidental benign or probably benign adnexal cyst on CT/MRI.  Managing Incidental Findings on Abdominal and Pelvic CT and MRI, Part 1: White Paper of the ACR Incidental Findings Committee II on Adnexal Findings. J Am Elizabeth Radiol 2013;10:675-681.    Premenopausal:  Less than 5 cm: Benign, no follow up.    Greater than 5 cm: US follow up in 8 weeks.       US Abdomen Limited    Narrative    EXAM: US ABDOMEN LIMITED  LOCATION: [facility]  DATE/TIME: 1/14/2021 10:30 PM    INDICATION: Right upper quadrant pain and vomiting.  COMPARISON: CT from 1/14/2021.  TECHNIQUE: Limited abdominal ultrasound.    FINDINGS:    GALLBLADDER: Multiple small stones in the gallbladder as well as sludge material. No gallbladder wall thickening. Sonographic Jasso sign is negative. Small amount of pericholecystic fluid.    BILE DUCTS: No common bile duct is dilated up to 1.0 cm. There is limited visualization of the distal common bile duct, however, there is questionable possible distal common bile duct stone although not well seen. MRCP is recommended in further   evaluation. Mild intrahepatic biliary dilatation is also present.    LIVER: Normal parenchyma with smooth contour. No focal mass.    RIGHT KIDNEY: No hydronephrosis.    PANCREAS: The visualized portions are normal.    No ascites.      Impression    IMPRESSION:  1.   Common bile duct is dilated up to 1.0 cm and there is a possible but not definitive distal common bile duct stone. Given these findings, recommend further evaluation with MRCP.    2.  Multiple gallstones as well as sludge in the gallbladder. No definite gallbladder wall thickening and sonographic Jasso sign is negative. However, there is a small amount of pericholecystic fluid. Ultimately, findings are somewhat equivocal but felt   to be less likely due to cholecystitis at this time. If patient's symptoms persist or progress, follow-up repeat ultrasound would be helpful in further evaluation.    3.  Remainder of the right upper quadrant ultrasound is negative.       This note was created using voice recognition software. Undetected word substitutions or other errors may have occurred.     Time spent with the patient, reviewing the EMR, reviewing laboratory and imaging studies, more than 50% of which was counseling and coordinating care:  30 minutes.     Ansley Browne MD

## 2021-01-15 NOTE — UTILIZATION REVIEW
"  Admission Status; Secondary Review Determination         Under the authority of the Utilization Management Committee, the utilization review process indicated a secondary review on the above patient.  The review outcome is based on review of the medical records, discussions with staff, and applying clinical experience noted on the date of the review.        (x)      Inpatient Status Appropriate - This patient's medical care is consistent with medical management for inpatient care and reasonable inpatient medical practice.      () Observation Status Appropriate - This patient does not meet hospital inpatient criteria and is placed in observation status. If this patient's primary payer is Medicare and was admitted as an inpatient, Condition Code 44 should be used and patient status changed to \"observation\".   () Admission Status NOT Appropriate - This patient's medical care is not consistent with medical management for Inpatient or Observation Status.          RATIONALE FOR DETERMINATION     Lisa Betancourt is a 50-year-old woman with RA on humira and methotrexate who was admitted with abrupt onset right upper quadrant abdominal pain and severe vomiting.  WBC and LFTs are elevated. BC pending.  CT scan shows moderate distention of the gallbladder and CBD measures 10mm.  She is requiring IVF, IV pain medication, IV antibiotics and close monitoring.  Plan for EUS and possible ERCP.  It is reasonable to anticipate a hospital stay of at least 2 midnights.  IP status appropriate.    The severity of illness, intensity of service provided, expected LOS and risk for adverse outcome make the care complex, high risk and appropriate for hospital admission.        The information on this document is developed by the utilization review team in order for the business office to ensure compliance.  This only denotes the appropriateness of proper admission status and does not reflect the quality of care rendered.         The definitions " of Inpatient Status and Observation Status used in making the determination above are those provided in the CMS Coverage Manual, Chapter 1 and Chapter 6, section 70.4.      Sincerely,     Martine Wilson MD  Physician Advisor   Utilization Review/ Case Management  [facility].

## 2021-01-15 NOTE — ANESTHESIA CARE TRANSFER NOTE
Patient: Lisa Betancourt    Procedure(s):  ENDOSCOPIC RETROGRADE CHOLANGIOPANCREATOGRAPHY, SPINCTEROTOMY AND STONE EXTRACTION    Diagnosis: Cholecystitis [K81.9]  Diagnosis Additional Information: No value filed.    Anesthesia Type:   General     Note:  Airway :Face Mask  Patient transferred to:PACU  Comments: Pt to PACU, VS, report to RNHandoff Report: Identifed the Patient, Identified the Reponsible Provider, Reviewed the pertinent medical history, Discussed the surgical course, Reviewed Intra-OP anesthesia mangement and issues during anesthesia, Set expectations for post-procedure period and Allowed opportunity for questions and acknowledgement of understanding      Vitals: (Last set prior to Anesthesia Care Transfer)    CRNA VITALS  1/15/2021 1408 - 1/15/2021 1444      1/15/2021             EKG:  NSR                Electronically Signed By: SKYE Marion CRNA  January 15, 2021  2:44 PM

## 2021-01-15 NOTE — PHARMACY-ADMISSION MEDICATION HISTORY
Admission medication history interview status for this patient is complete. See [facility] admission navigator for allergy information, prior to admission medications and immunization status.     Medication history interview source(s):Patient via phone  Medication history resources (including written lists, pill bottles, clinic record):Yifan Sorenson  Primary pharmacy:[facility] ([facility])    Changes made to PTA medication list:  Added: amlodipine 5mg daily (pt takes 1/2 of 10mg tablet daily)  Deleted: amlodipine 10mg daily, furosemide 10mg daily prn feet swelling, oxybutynin 5mg bid  Changed: tylenol from 500mg prn to 1000mg q6h prn, calcium citrate from 2 tabs bid to daily per pt,     Actions taken by pharmacist (provider contacted, etc):None     Additional medication history information:None    Medication reconciliation/reorder completed by provider prior to medication history? Yes, sticky note left for MD      For patients on insulin therapy:N    Prior to Admission medications    Medication Sig Last Dose Taking? Auth Provider   acetaminophen (TYLENOL) 500 MG tablet Take 1,000 mg by mouth every 6 hours as needed for other (migraine) At the onset of migraine with Rizatriptan  1/14/2021 at Unknown time Yes Unknown, Entered By History   amLODIPine (NORVASC) 10 MG tablet Take 5 mg by mouth daily 1/14/2021 at Unknown time Yes Unknown, Entered By History   biotin 1000 MCG TABS tablet Take 1,000 mcg by mouth daily 1/14/2021 at Unknown time Yes Unknown, Entered By History   calcium citrate (CITRACAL) 950 MG tablet Take 2 tablets by mouth daily  1/14/2021 at Unknown time Yes Unknown, Entered By History   cetirizine (ZYRTEC) 10 MG tablet Take 10 mg by mouth daily 1/14/2021 at Unknown time Yes Unknown, Entered By History   fluticasone (FLONASE) 50 MCG/ACT nasal spray Spray 2 sprays into both nostrils daily as needed for rhinitis or allergies Past Week at Unknown time Yes Unknown, Entered By  History   folic acid (FOLVITE) 1 MG tablet Take 1 mg by mouth daily 1/14/2021 at Unknown time Yes Unknown, Entered By History   Levothyroxine Sodium (SYNTHROID PO) Take 175 mcg by mouth daily  1/14/2021 at Unknown time Yes Reported, Patient   multivitamin w/minerals (MULTI-VITAMIN) tablet Take 1 tablet by mouth daily 1/14/2021 at Unknown time Yes Unknown, Entered By History   rizatriptan (MAXALT) 10 MG tablet Take 10 mg by mouth at onset of headache for migraine Repeat Q2H PRN x 2 more doses. Max 30 mg in 24 hours Past Week at Unknown time Yes Unknown, Entered By History   Vitamin D, Cholecalciferol, 25 MCG (1000 UT) TABS Take 2,000 Units by mouth daily 1/14/2021 at Unknown time Yes Unknown, Entered By History   adalimumab (HUMIRA) 40 MG/0.8ML syr kit Inject 40 mg Subcutaneous every 14 days 1/10/2021  Reported, Patient   cyanocobalamin (VITAMIN B-12) 1000 MCG tablet Take 1,000 mcg by mouth once a week 1/9/2021  Unknown, Entered By History   methotrexate 2.5 MG tablet Take 12.5 mg by mouth once a week 1/9/2021  Unknown, Entered By History

## 2021-01-15 NOTE — ED NOTES
[facility]  ED Nurse Handoff Report    Lisa Betancourt is a 50 year old female   ED Chief complaint: Abdominal Pain  . ED Diagnosis:   Final diagnoses:   Choledocholithiasis   Abnormal ultrasound   Acute abdominal pain   Adnexal cyst     Allergies: No Known Allergies    Code Status: Full Code  Activity level - Baseline/Home:  Independent. Activity Level - Current:   Independent. Lift room needed: No. Bariatric: No   Needed: No   Isolation: No. Infection: Not Applicable.     Vital Signs:   Vitals:    01/14/21 2030 01/14/21 2130 01/14/21 2200 01/14/21 2300   BP: 122/70 116/75 108/64 109/64   Pulse: 57 68 60    Resp:       Temp:       TempSrc:       SpO2: 100% 100% 96%    Weight:       Height:           Cardiac Rhythm:  ,      Pain level: 0-10 Pain Scale: 3  Patient confused: No. Patient Falls Risk: Yes.   Elimination Status: Has voided   Patient Report - Initial Complaint: RUQ. Focused Assessment: patient reports RUQ pain after eating mac and cheese and fish sticks, c/o n/v   Pain upon palpation RUQ.   HX gastric sleeve   Tests Performed: CBC, US abdomen, CMP . Abnormal Results:   Labs Ordered and Resulted from Time of ED Arrival Up to the Time of Departure from the ED   CBC WITH PLATELETS DIFFERENTIAL - Abnormal; Notable for the following components:       Result Value    WBC 13.1 (*)     Absolute Neutrophil 8.8 (*)     All other components within normal limits   COMPREHENSIVE METABOLIC PANEL - Abnormal; Notable for the following components:    Chloride 113 (*)      (*)      (*)     All other components within normal limits   LIPASE   SARS-COV-2 (COVID-19) VIRUS RT-PCR     US Abdomen Limited   Final Result   IMPRESSION:   1.  Common bile duct is dilated up to 1.0 cm and there is a possible but not definitive distal common bile duct stone. Given these findings, recommend further evaluation with MRCP.      2.  Multiple gallstones as well as sludge in the gallbladder. No definite  gallbladder wall thickening and sonographic Jasso sign is negative. However, there is a small amount of pericholecystic fluid. Ultimately, findings are somewhat equivocal but felt    to be less likely due to cholecystitis at this time. If patient's symptoms persist or progress, follow-up repeat ultrasound would be helpful in further evaluation.      3.  Remainder of the right upper quadrant ultrasound is negative.      CT Abdomen Pelvis w Contrast   Final Result   IMPRESSION:    1.  Gastric sleeve procedure, with moderate gastric distention.   2.  Dilated common bile duct is more prominent compared to the previous exam. No obvious obstructing lesion. Correlation with liver function tests recommended.   3.  Numerous slightly distended loops of small bowel within the left abdomen and pelvis, nonspecific. No inflammatory changes or other evidence for obstruction.    4.  Right adnexal cyst, presumed physiologic. Please see guidelines below for follow-up.   5.  Cervical nabothian cyst.      REFERENCE:   Guidelines for incidental benign or probably benign adnexal cyst on CT/MRI.   Managing Incidental Findings on Abdominal and Pelvic CT and MRI, Part 1: White Paper of the ACR Incidental Findings Committee II on Adnexal Findings. J Am Elizabeth Radiol 2013;10:675-681.      Premenopausal:   Less than 5 cm: Benign, no follow up.      Greater than 5 cm: US follow up in 8 weeks.              .   Treatments provided:   Dilaudid  NS bolus  zofran     Family Comments: no family at bedside   OBS brochure/video discussed/provided to patient:  N/A  ED Medications:   Medications   0.9% sodium chloride BOLUS (0 mLs Intravenous Stopped 1/14/21 2142)     Followed by   sodium chloride 0.9% infusion (has no administration in time range)   ondansetron (ZOFRAN) injection 4 mg (4 mg Intravenous Given 1/14/21 1948)   HYDROmorphone (PF) (DILAUDID) injection 0.5 mg (0.5 mg Intravenous Given 1/14/21 2130)   HYDROmorphone (PF) (DILAUDID) injection 0.5  mg (0.5 mg Intravenous Given 1/14/21 1948)   iohexol (OMNIPAQUE) solution 25 mL (25 mLs Oral Given 1/14/21 2008)   sodium chloride (PF) 0.9% PF flush 100 mL (61 mLs Intravenous Given 1/14/21 2052)   iopamidol (ISOVUE-370) solution 500 mL (85 mLs Intravenous Given 1/14/21 2047)     Drips infusing:  No  For the majority of the shift, the patient's behavior Green. Interventions performed were NA .    Sepsis treatment initiated: No     Patient tested for COVID 19 prior to admission: YES    ED Nurse Name/Phone Number: Sarah Santiago RN,   12:12 AM    RECEIVING UNIT ED HANDOFF REVIEW    Above ED Nurse Handoff Report was reviewed: YES  Reviewed by: Ana Maria Valdez RN on January 15, 2021 at 1:06 AM

## 2021-01-15 NOTE — H&P
"[facility] History and Physical    Lisa Betancourt MRN# [id number]   Age: 50 year old YOB: 1970     Date of Admission:  1/14/2021    Home clinic: [facility], [place]  Primary care provider: Jessica Mcgrath          Assessment and Plan:   Assessment:   Lisa Betancourt is a 50-year-old woman who comes to attention with rather abrupt onset right upper quadrant abdominal pain and severe vomiting.      Past medical history is significant for rheumatoid arthritis for which the patient is on Humira as well as methotrexate.  She also underwent gastric sleeve surgery in 11/19.  She has done very well with that surgery and has lost about 70 pounds and feels very good about that.  She does recall however, that she was evaluated for cholelithiasis prior to that surgery and was that she did not have any gallstones.  (Right upper quadrant ultrasound 9/2019 shows \"gallbladder without stones, sludge or debris\" and common bile duct 0.6 cm.)    In the emergency department, Ms. Betancourt was initially hypertensive but subsequently settled down to blood pressure 113/72, HR 50s to 60s, RR 18 with oxygen saturation in the high 90s breathing room air.  Afebrile.  Examination was notable for right upper quadrant tenderness to palpation without rebound or guarding.  Labs: Creatinine 0.97 /, alk phos 86, total bilirubin 0.3.  WBC 13.1, Hgb 13.0, .  CT abdomen and pelvis shows \"Moderate distention of the gallbladder. The common bile duct is dilated measuring 10 mm. No evidence for distal obstructing stone or mass.\"  Follow-up right upper quadrant ultrasound indicates: \"Common bile duct is dilated up to 1.0 cm and there is a possible but not definitive distal common bile duct stone.\"  In addition, multiple gallstones with sludge and pericholecystic fluid noted.    Diagnoses:  1.  Choledocholithiasis suspected based on the presence of dilated common bile duct.  Interesting that the bilirubin is not " "elevated.  2.  Cholelithiasis with suspected cholecystitis.  3.  Immunosuppressed on Humira for rheumatoid arthritis.  4.  Preoperative considerations: Patient is tolerated anesthetic with complaints of nausea in the past.  She has had multiple surgeries that have otherwise been well-tolerated.  I see no medical indication for which surgery should be delayed.      Plan:   1.  Admit to inpatient.  2.  I asked for blood cultures to be obtained from 2 sites while she is still in the emergency department and that we empirically cover the patient with Unasyn for now.  3.  Patient will need both GI and surgical consultations.  I recognize that the surgery may not be inclined to take the patient to the operating room until after the question of choledocholithiasis is clarified.  4.  For now the patient is admitted at n.p.o. in anticipation of anesthesia.  5.  Covid PCR is pending at this time.  I note that the patient completed her 2 shot regimen of Covid immunization just this past week.  She is a nurse at the [employer].             Chief Complaint:   Right upper quadrant abdominal pain associated with vomiting.     History is obtained from the patient, emergency department provider and electronic medical record.    Ms. Betancourt states that she has never had issues similar to this in the past.  She was struck by the abruptness of the onset of symptoms following eating dinner of mac & cheese with fish.  She had discomfort that immediately localized to the right upper chest and seemed to radiate through to her back.  She also had significant vomiting, indicating that she vomited up \"everything she ate\" but without associated hematemesis.    Ms. Betancourt indicates that she is generally been very well without problems associated with shortness of breath or chest pain.  She is a lifelong non-smoker and does not drink.  She tells me that her last treatment with Humira was just this past weekend.  She takes methotrexate every " Saturday.    Ms. has firmly denies fevers, sweats and chills associated with this illness.        Past Medical History:     Past Medical History:   Diagnosis Date     Hypothyroid      Renal disease      Rheumatoid arthritis(714.0)              Past Surgical History:      Past Surgical History:   Procedure Laterality Date     BREAST LUMPECTOMY, RT/LT      Breast Lumpectomy LT     C/SECTION, LOW TRANSVERSE      , Low Transverse     COMBINED CYSTOSCOPY, INSERT STENT URETER(S) Left 3/26/2020    Procedure: Video cystoscopy, left double-J stent placement (5-French x 24 cm);  Surgeon: Torres Ware MD;  Location: [facility] OR     HERNIA REPAIR, UMBILICAL  2002     LASER HOLMIUM LITHOTRIPSY URETER(S), INSERT STENT, COMBINED Left 2020    Procedure: Video cystoscopy, left double-J stent removal, flexible left ureteroscopy and rhinoscopy with stone extraction. (laser on standby only) ;  Surgeon: Torres Ware MD;  Location: [facility] OR     TUBAL LIGATION               Social History:     Social History     Tobacco Use     Smoking status: Never Smoker     Smokeless tobacco: Never Used   Substance Use Topics     Alcohol use: Not Currently     Alcohol/week: 0.0 standard drinks     Frequency: Never             Family History:   Reviewed and considered noncontributory.           Allergies:   No Known Allergies          Medications:   Humira every 14 days  Amlodipine 10 mg daily  Cetirizine 10 mg daily  Fluticasone 2 sprays in each nostril daily as needed  Lasix 10 mg daily as needed  Levothyroxine 175 mcg daily  Methotrexate 12.5 mg weekly on   Oxybutynin 5 mg twice daily  Rizatriptan 10 mg at the onset of headache may repeat every 2 hours for 2 more doses as needed with a maximum of 30 mg in 24 hours         Review of Systems:   A comprehensive review of systems was performed and found to be negative except as described in this note           Physical Exam:   Vitals were reviewed  Temp: 98.4  F (36.9  C) Temp  src: Oral BP: 109/64 Pulse: 60   Resp: 18 SpO2: 96 % O2 Device: None (Room air)    Constitutional: Awake, alert, cooperative, no apparent distress, and appears stated age.  Eyes: Lids and lashes normal, pupils equal, round and reactive to light, extra ocular muscles intact, sclera clear, conjunctiva normal.  ENT: Normocephalic, without obvious abnormality, atraumatic.  Oropharynx is without lesions or posterior crowding, gums normal and good dentition.  Neck: Supple, symmetrical, trachea midline, no adenopathy, thyroid symmetric, not enlarged and no tenderness, skin normal.  Hematologic / Lymphatic: No cervical lymphadenopathy and no supraclavicular lymphadenopathy.  Back: Symmetric, no curvature, spinous processes are non-tender on palpation, paraspinous muscles are non-tender on palpation, no costal vertebral tenderness.  Lungs: No increased work of breathing, good air exchange, clear to auscultation bilaterally, no crackles or wheezing.  Cardiovascular: Regular rate and rhythm, normal S1 and S2, no S3 or S4, and no murmur noted.  Abdomen: Normal bowel sounds, soft, non-distended, no masses palpated, no hepatosplenomegaly.  Right upper quadrant tenderness is noted compatible with suspected cholecystitis.  Musculoskeletal: No redness, warmth, or swelling of the joints. Tone is normal.  Neurologic: Awake, alert, oriented to name, place and time.  Cranial nerves II-XII are grossly intact.    Neuropsychiatric: Normal affect, mood, orientation, memory and insight.  Skin: No rashes, erythema, pallor, petechia or purpura.          Data:     Results for orders placed or performed during the hospital encounter of 01/14/21 (from the past 24 hour(s))   CBC with platelets differential   Result Value Ref Range    WBC 13.1 (H) 4.0 - 11.0 10e9/L    RBC Count 4.01 3.8 - 5.2 10e12/L    Hemoglobin 13.0 11.7 - 15.7 g/dL    Hematocrit 39.2 35.0 - 47.0 %    MCV 98 78 - 100 fl    MCH 32.4 26.5 - 33.0 pg    MCHC 33.2 31.5 - 36.5 g/dL     RDW 14.2 10.0 - 15.0 %    Platelet Count 268 150 - 450 10e9/L    Diff Method Automated Method     % Neutrophils 67.1 %    % Lymphocytes 20.7 %    % Monocytes 7.9 %    % Eosinophils 3.6 %    % Basophils 0.3 %    % Immature Granulocytes 0.4 %    Nucleated RBCs 0 0 /100    Absolute Neutrophil 8.8 (H) 1.6 - 8.3 10e9/L    Absolute Lymphocytes 2.7 0.8 - 5.3 10e9/L    Absolute Monocytes 1.0 0.0 - 1.3 10e9/L    Absolute Eosinophils 0.5 0.0 - 0.7 10e9/L    Absolute Basophils 0.0 0.0 - 0.2 10e9/L    Abs Immature Granulocytes 0.1 0 - 0.4 10e9/L    Absolute Nucleated RBC 0.0    Comprehensive metabolic panel   Result Value Ref Range    Sodium 141 133 - 144 mmol/L    Potassium 3.7 3.4 - 5.3 mmol/L    Chloride 113 (H) 94 - 109 mmol/L    Carbon Dioxide 24 20 - 32 mmol/L    Anion Gap 4 3 - 14 mmol/L    Glucose 97 70 - 99 mg/dL    Urea Nitrogen 16 7 - 30 mg/dL    Creatinine 0.97 0.52 - 1.04 mg/dL    GFR Estimate 69 >60 mL/min/[1.73_m2]    GFR Estimate If Black 80 >60 mL/min/[1.73_m2]    Calcium 8.5 8.5 - 10.1 mg/dL    Bilirubin Total 0.3 0.2 - 1.3 mg/dL    Albumin 3.5 3.4 - 5.0 g/dL    Protein Total 7.3 6.8 - 8.8 g/dL    Alkaline Phosphatase 86 40 - 150 U/L     (H) 0 - 50 U/L     (H) 0 - 45 U/L   Lipase   Result Value Ref Range    Lipase 164 73 - 393 U/L   CT Abdomen Pelvis w Contrast    Narrative    EXAM: CT ABDOMEN PELVIS W CONTRAST  LOCATION: [facility]  DATE/TIME: 1/14/2021 8:45 PM    INDICATION: Abdominal pain and vomiting. Gastric sleeve procedure one year ago.  COMPARISON: CT abdomen pelvis 03/26/2020  TECHNIQUE: CT scan of the abdomen and pelvis was performed following injection of IV contrast. Multiplanar reformats were obtained. Dose reduction techniques were used.  CONTRAST: 85 mL Isovue-370    FINDINGS:   LOWER CHEST: Scarring or atelectasis right lower lobe medially adjacent to the spine, unchanged. Small esophageal hiatal hernia.    HEPATOBILIARY: The liver is unremarkable. Moderate  distention of the gallbladder. The common bile duct is dilated measuring 10 mm. No evidence for distal obstructing stone or mass.    PANCREAS: Normal.    SPLEEN: Normal.    ADRENAL GLANDS: Normal.    KIDNEYS/BLADDER: Normal.    BOWEL: Gastric sleeve procedure. Moderate gastric distention. Numerous slightly distended loops of small bowel within the abdomen and pelvis, but no findings for obstruction. Colon is of normal caliber. No inflammatory changes.    LYMPH NODES: Normal.    VASCULATURE: Unremarkable.    PELVIC ORGANS: 3 cm right adnexal cyst and trace free pelvic fluid. Cervical nabothian cyst.    MUSCULOSKELETAL: Degenerative disc and facet disease L5 level.      Impression    IMPRESSION:   1.  Gastric sleeve procedure, with moderate gastric distention.  2.  Dilated common bile duct is more prominent compared to the previous exam. No obvious obstructing lesion. Correlation with liver function tests recommended.  3.  Numerous slightly distended loops of small bowel within the left abdomen and pelvis, nonspecific. No inflammatory changes or other evidence for obstruction.   4.  Right adnexal cyst, presumed physiologic. Please see guidelines below for follow-up.  5.  Cervical nabothian cyst.    REFERENCE:  Guidelines for incidental benign or probably benign adnexal cyst on CT/MRI.  Managing Incidental Findings on Abdominal and Pelvic CT and MRI, Part 1: White Paper of the ACR Incidental Findings Committee II on Adnexal Findings. J Am Elizabeth Radiol 2013;10:675-681.    Premenopausal:  Less than 5 cm: Benign, no follow up.    Greater than 5 cm: US follow up in 8 weeks.       US Abdomen Limited    Narrative    EXAM: US ABDOMEN LIMITED  LOCATION: [facility]  DATE/TIME: 1/14/2021 10:30 PM    INDICATION: Right upper quadrant pain and vomiting.  COMPARISON: CT from 1/14/2021.  TECHNIQUE: Limited abdominal ultrasound.    FINDINGS:    GALLBLADDER: Multiple small stones in the gallbladder as well as sludge  material. No gallbladder wall thickening. Sonographic Jasso sign is negative. Small amount of pericholecystic fluid.    BILE DUCTS: No common bile duct is dilated up to 1.0 cm. There is limited visualization of the distal common bile duct, however, there is questionable possible distal common bile duct stone although not well seen. MRCP is recommended in further   evaluation. Mild intrahepatic biliary dilatation is also present.    LIVER: Normal parenchyma with smooth contour. No focal mass.    RIGHT KIDNEY: No hydronephrosis.    PANCREAS: The visualized portions are normal.    No ascites.      Impression    IMPRESSION:  1.  Common bile duct is dilated up to 1.0 cm and there is a possible but not definitive distal common bile duct stone. Given these findings, recommend further evaluation with MRCP.    2.  Multiple gallstones as well as sludge in the gallbladder. No definite gallbladder wall thickening and sonographic Jasso sign is negative. However, there is a small amount of pericholecystic fluid. Ultimately, findings are somewhat equivocal but felt   to be less likely due to cholecystitis at this time. If patient's symptoms persist or progress, follow-up repeat ultrasound would be helpful in further evaluation.    3.  Remainder of the right upper quadrant ultrasound is negative.      All imaging studies reviewed by me.      Attestation:  I have reviewed today's vital signs, notes, medications, labs and imaging.  Total time: 35 minutes     Tavon Chin MD

## 2021-01-16 ENCOUNTER — SURGERY (OUTPATIENT)
Age: 51
End: 2021-01-16
Payer: OTHER GOVERNMENT

## 2021-01-16 ENCOUNTER — ANESTHESIA EVENT (OUTPATIENT)
Dept: SURGERY | Facility: CLINIC | Age: 51
DRG: 419 | End: 2021-01-16
Payer: OTHER GOVERNMENT

## 2021-01-16 ENCOUNTER — ANESTHESIA (OUTPATIENT)
Dept: SURGERY | Facility: CLINIC | Age: 51
DRG: 419 | End: 2021-01-16
Payer: OTHER GOVERNMENT

## 2021-01-16 VITALS
BODY MASS INDEX: 29.92 KG/M2 | OXYGEN SATURATION: 100 % | TEMPERATURE: 97.3 F | SYSTOLIC BLOOD PRESSURE: 124 MMHG | WEIGHT: 186.2 LBS | DIASTOLIC BLOOD PRESSURE: 75 MMHG | HEIGHT: 66 IN | HEART RATE: 68 BPM | RESPIRATION RATE: 18 BRPM

## 2021-01-16 LAB
ALBUMIN SERPL-MCNC: 3 G/DL (ref 3.4–5)
ALP SERPL-CCNC: 65 U/L (ref 40–150)
ALT SERPL W P-5'-P-CCNC: 149 U/L (ref 0–50)
ANION GAP SERPL CALCULATED.3IONS-SCNC: 2 MMOL/L (ref 3–14)
AST SERPL W P-5'-P-CCNC: 53 U/L (ref 0–45)
BILIRUB SERPL-MCNC: 0.4 MG/DL (ref 0.2–1.3)
BUN SERPL-MCNC: 8 MG/DL (ref 7–30)
CALCIUM SERPL-MCNC: 8.3 MG/DL (ref 8.5–10.1)
CHLORIDE SERPL-SCNC: 113 MMOL/L (ref 94–109)
CO2 SERPL-SCNC: 26 MMOL/L (ref 20–32)
CREAT SERPL-MCNC: 0.93 MG/DL (ref 0.52–1.04)
ERYTHROCYTE [DISTWIDTH] IN BLOOD BY AUTOMATED COUNT: 14.4 % (ref 10–15)
GFR SERPL CREATININE-BSD FRML MDRD: 73 ML/MIN/{1.73_M2}
GLUCOSE SERPL-MCNC: 85 MG/DL (ref 70–99)
HCT VFR BLD AUTO: 37 % (ref 35–47)
HGB BLD-MCNC: 12.2 G/DL (ref 11.7–15.7)
MCH RBC QN AUTO: 32.3 PG (ref 26.5–33)
MCHC RBC AUTO-ENTMCNC: 33 G/DL (ref 31.5–36.5)
MCV RBC AUTO: 98 FL (ref 78–100)
PLATELET # BLD AUTO: 256 10E9/L (ref 150–450)
POTASSIUM SERPL-SCNC: 3.8 MMOL/L (ref 3.4–5.3)
PROT SERPL-MCNC: 6.3 G/DL (ref 6.8–8.8)
RBC # BLD AUTO: 3.78 10E12/L (ref 3.8–5.2)
SODIUM SERPL-SCNC: 141 MMOL/L (ref 133–144)
WBC # BLD AUTO: 12 10E9/L (ref 4–11)

## 2021-01-16 PROCEDURE — 85027 COMPLETE CBC AUTOMATED: CPT | Performed by: INTERNAL MEDICINE

## 2021-01-16 PROCEDURE — 36415 COLL VENOUS BLD VENIPUNCTURE: CPT | Performed by: INTERNAL MEDICINE

## 2021-01-16 PROCEDURE — 250N000011 HC RX IP 250 OP 636: Performed by: INTERNAL MEDICINE

## 2021-01-16 PROCEDURE — 258N000001 HC RX 258: Performed by: SURGERY

## 2021-01-16 PROCEDURE — 47562 LAPAROSCOPIC CHOLECYSTECTOMY: CPT | Mod: AS | Performed by: PHYSICIAN ASSISTANT

## 2021-01-16 PROCEDURE — 370N000017 HC ANESTHESIA TECHNICAL FEE, PER MIN: Performed by: SURGERY

## 2021-01-16 PROCEDURE — 47562 LAPAROSCOPIC CHOLECYSTECTOMY: CPT | Performed by: SURGERY

## 2021-01-16 PROCEDURE — 99239 HOSP IP/OBS DSCHRG MGMT >30: CPT | Performed by: INTERNAL MEDICINE

## 2021-01-16 PROCEDURE — 360N000076 HC SURGERY LEVEL 3, PER MIN: Performed by: SURGERY

## 2021-01-16 PROCEDURE — 710N000012 HC RECOVERY PHASE 2, PER MINUTE: Performed by: SURGERY

## 2021-01-16 PROCEDURE — 250N000013 HC RX MED GY IP 250 OP 250 PS 637: Performed by: SURGERY

## 2021-01-16 PROCEDURE — 250N000009 HC RX 250: Performed by: NURSE ANESTHETIST, CERTIFIED REGISTERED

## 2021-01-16 PROCEDURE — 250N000011 HC RX IP 250 OP 636: Performed by: ANESTHESIOLOGY

## 2021-01-16 PROCEDURE — 710N000009 HC RECOVERY PHASE 1, LEVEL 1, PER MIN: Performed by: SURGERY

## 2021-01-16 PROCEDURE — 258N000003 HC RX IP 258 OP 636: Performed by: ANESTHESIOLOGY

## 2021-01-16 PROCEDURE — 0FT44ZZ RESECTION OF GALLBLADDER, PERCUTANEOUS ENDOSCOPIC APPROACH: ICD-10-PCS | Performed by: SURGERY

## 2021-01-16 PROCEDURE — 250N000011 HC RX IP 250 OP 636: Performed by: NURSE ANESTHETIST, CERTIFIED REGISTERED

## 2021-01-16 PROCEDURE — 88304 TISSUE EXAM BY PATHOLOGIST: CPT | Mod: 26 | Performed by: PATHOLOGY

## 2021-01-16 PROCEDURE — 250N000013 HC RX MED GY IP 250 OP 250 PS 637: Performed by: INTERNAL MEDICINE

## 2021-01-16 PROCEDURE — 250N000011 HC RX IP 250 OP 636: Performed by: SURGERY

## 2021-01-16 PROCEDURE — 272N000001 HC OR GENERAL SUPPLY STERILE: Performed by: SURGERY

## 2021-01-16 PROCEDURE — 250N000013 HC RX MED GY IP 250 OP 250 PS 637: Performed by: ANESTHESIOLOGY

## 2021-01-16 PROCEDURE — 88304 TISSUE EXAM BY PATHOLOGIST: CPT | Mod: TC | Performed by: SURGERY

## 2021-01-16 PROCEDURE — 80053 COMPREHEN METABOLIC PANEL: CPT | Performed by: INTERNAL MEDICINE

## 2021-01-16 PROCEDURE — 250N000009 HC RX 250: Performed by: SURGERY

## 2021-01-16 PROCEDURE — 999N000141 HC STATISTIC PRE-PROCEDURE NURSING ASSESSMENT: Performed by: SURGERY

## 2021-01-16 RX ORDER — CEFAZOLIN SODIUM 1 G/50ML
1 INJECTION, SOLUTION INTRAVENOUS SEE ADMIN INSTRUCTIONS
Status: DISCONTINUED | OUTPATIENT
Start: 2021-01-16 | End: 2021-01-16 | Stop reason: HOSPADM

## 2021-01-16 RX ORDER — ONDANSETRON 4 MG/1
4-8 TABLET, ORALLY DISINTEGRATING ORAL EVERY 8 HOURS PRN
Qty: 8 TABLET | Refills: 0 | Status: SHIPPED | OUTPATIENT
Start: 2021-01-16

## 2021-01-16 RX ORDER — LABETALOL HYDROCHLORIDE 5 MG/ML
10 INJECTION, SOLUTION INTRAVENOUS
Status: DISCONTINUED | OUTPATIENT
Start: 2021-01-16 | End: 2021-01-16 | Stop reason: HOSPADM

## 2021-01-16 RX ORDER — NALOXONE HYDROCHLORIDE 0.4 MG/ML
0.4 INJECTION, SOLUTION INTRAMUSCULAR; INTRAVENOUS; SUBCUTANEOUS
Status: DISCONTINUED | OUTPATIENT
Start: 2021-01-16 | End: 2021-01-16 | Stop reason: HOSPADM

## 2021-01-16 RX ORDER — GLYCOPYRROLATE 0.2 MG/ML
INJECTION, SOLUTION INTRAMUSCULAR; INTRAVENOUS PRN
Status: DISCONTINUED | OUTPATIENT
Start: 2021-01-16 | End: 2021-01-16

## 2021-01-16 RX ORDER — ACETAMINOPHEN 325 MG/1
975 TABLET ORAL ONCE
Status: COMPLETED | OUTPATIENT
Start: 2021-01-16 | End: 2021-01-16

## 2021-01-16 RX ORDER — HYDROMORPHONE HYDROCHLORIDE 1 MG/ML
.3-.5 INJECTION, SOLUTION INTRAMUSCULAR; INTRAVENOUS; SUBCUTANEOUS EVERY 10 MIN PRN
Status: DISCONTINUED | OUTPATIENT
Start: 2021-01-16 | End: 2021-01-16 | Stop reason: HOSPADM

## 2021-01-16 RX ORDER — FENTANYL CITRATE 50 UG/ML
25-50 INJECTION, SOLUTION INTRAMUSCULAR; INTRAVENOUS
Status: DISCONTINUED | OUTPATIENT
Start: 2021-01-16 | End: 2021-01-16 | Stop reason: HOSPADM

## 2021-01-16 RX ORDER — FENTANYL CITRATE 50 UG/ML
INJECTION, SOLUTION INTRAMUSCULAR; INTRAVENOUS PRN
Status: DISCONTINUED | OUTPATIENT
Start: 2021-01-16 | End: 2021-01-16

## 2021-01-16 RX ORDER — KETOROLAC TROMETHAMINE 30 MG/ML
30 INJECTION, SOLUTION INTRAMUSCULAR; INTRAVENOUS ONCE
Status: COMPLETED | OUTPATIENT
Start: 2021-01-16 | End: 2021-01-16

## 2021-01-16 RX ORDER — NALOXONE HYDROCHLORIDE 0.4 MG/ML
0.2 INJECTION, SOLUTION INTRAMUSCULAR; INTRAVENOUS; SUBCUTANEOUS
Status: DISCONTINUED | OUTPATIENT
Start: 2021-01-16 | End: 2021-01-16 | Stop reason: HOSPADM

## 2021-01-16 RX ORDER — ONDANSETRON 2 MG/ML
4 INJECTION INTRAMUSCULAR; INTRAVENOUS EVERY 30 MIN PRN
Status: DISCONTINUED | OUTPATIENT
Start: 2021-01-16 | End: 2021-01-16 | Stop reason: HOSPADM

## 2021-01-16 RX ORDER — PROPOFOL 10 MG/ML
INJECTION, EMULSION INTRAVENOUS PRN
Status: DISCONTINUED | OUTPATIENT
Start: 2021-01-16 | End: 2021-01-16

## 2021-01-16 RX ORDER — SODIUM CHLORIDE, SODIUM LACTATE, POTASSIUM CHLORIDE, CALCIUM CHLORIDE 600; 310; 30; 20 MG/100ML; MG/100ML; MG/100ML; MG/100ML
INJECTION, SOLUTION INTRAVENOUS CONTINUOUS
Status: DISCONTINUED | OUTPATIENT
Start: 2021-01-16 | End: 2021-01-16 | Stop reason: HOSPADM

## 2021-01-16 RX ORDER — LIDOCAINE 40 MG/G
CREAM TOPICAL
Status: DISCONTINUED | OUTPATIENT
Start: 2021-01-16 | End: 2021-01-16 | Stop reason: HOSPADM

## 2021-01-16 RX ORDER — OXYCODONE HYDROCHLORIDE 5 MG/1
5-10 TABLET ORAL EVERY 4 HOURS PRN
Qty: 20 TABLET | Refills: 0 | Status: SHIPPED | OUTPATIENT
Start: 2021-01-16

## 2021-01-16 RX ORDER — CEFAZOLIN SODIUM 2 G/100ML
2 INJECTION, SOLUTION INTRAVENOUS
Status: COMPLETED | OUTPATIENT
Start: 2021-01-16 | End: 2021-01-16

## 2021-01-16 RX ORDER — ONDANSETRON 2 MG/ML
INJECTION INTRAMUSCULAR; INTRAVENOUS PRN
Status: DISCONTINUED | OUTPATIENT
Start: 2021-01-16 | End: 2021-01-16

## 2021-01-16 RX ORDER — DEXAMETHASONE SODIUM PHOSPHATE 4 MG/ML
INJECTION, SOLUTION INTRA-ARTICULAR; INTRALESIONAL; INTRAMUSCULAR; INTRAVENOUS; SOFT TISSUE PRN
Status: DISCONTINUED | OUTPATIENT
Start: 2021-01-16 | End: 2021-01-16

## 2021-01-16 RX ORDER — NEOSTIGMINE METHYLSULFATE 1 MG/ML
VIAL (ML) INJECTION PRN
Status: DISCONTINUED | OUTPATIENT
Start: 2021-01-16 | End: 2021-01-16

## 2021-01-16 RX ORDER — ONDANSETRON 4 MG/1
4 TABLET, ORALLY DISINTEGRATING ORAL EVERY 30 MIN PRN
Status: DISCONTINUED | OUTPATIENT
Start: 2021-01-16 | End: 2021-01-16 | Stop reason: HOSPADM

## 2021-01-16 RX ORDER — BUPIVACAINE HYDROCHLORIDE AND EPINEPHRINE 5; 5 MG/ML; UG/ML
INJECTION, SOLUTION PERINEURAL PRN
Status: DISCONTINUED | OUTPATIENT
Start: 2021-01-16 | End: 2021-01-16 | Stop reason: HOSPADM

## 2021-01-16 RX ORDER — MEPERIDINE HYDROCHLORIDE 25 MG/ML
12.5 INJECTION INTRAMUSCULAR; INTRAVENOUS; SUBCUTANEOUS
Status: DISCONTINUED | OUTPATIENT
Start: 2021-01-16 | End: 2021-01-16 | Stop reason: HOSPADM

## 2021-01-16 RX ORDER — OXYCODONE HYDROCHLORIDE 5 MG/1
5 TABLET ORAL
Status: COMPLETED | OUTPATIENT
Start: 2021-01-16 | End: 2021-01-16

## 2021-01-16 RX ORDER — LIDOCAINE HYDROCHLORIDE 10 MG/ML
INJECTION, SOLUTION INFILTRATION; PERINEURAL PRN
Status: DISCONTINUED | OUTPATIENT
Start: 2021-01-16 | End: 2021-01-16

## 2021-01-16 RX ADMIN — FENTANYL CITRATE 50 MCG: 50 INJECTION, SOLUTION INTRAMUSCULAR; INTRAVENOUS at 11:37

## 2021-01-16 RX ADMIN — FENTANYL CITRATE 50 MCG: 50 INJECTION, SOLUTION INTRAMUSCULAR; INTRAVENOUS at 12:50

## 2021-01-16 RX ADMIN — MIDAZOLAM 2 MG: 1 INJECTION INTRAMUSCULAR; INTRAVENOUS at 11:07

## 2021-01-16 RX ADMIN — FENTANYL CITRATE 100 MCG: 50 INJECTION, SOLUTION INTRAMUSCULAR; INTRAVENOUS at 11:12

## 2021-01-16 RX ADMIN — GLYCOPYRROLATE 0.2 MG: 0.2 INJECTION, SOLUTION INTRAMUSCULAR; INTRAVENOUS at 11:12

## 2021-01-16 RX ADMIN — PROPOFOL 200 MG: 10 INJECTION, EMULSION INTRAVENOUS at 11:12

## 2021-01-16 RX ADMIN — FENTANYL CITRATE 50 MCG: 50 INJECTION, SOLUTION INTRAMUSCULAR; INTRAVENOUS at 12:36

## 2021-01-16 RX ADMIN — ONDANSETRON HYDROCHLORIDE 4 MG: 2 INJECTION, SOLUTION INTRAVENOUS at 11:55

## 2021-01-16 RX ADMIN — FENTANYL CITRATE 50 MCG: 50 INJECTION, SOLUTION INTRAMUSCULAR; INTRAVENOUS at 13:01

## 2021-01-16 RX ADMIN — OXYCODONE HYDROCHLORIDE 5 MG: 5 TABLET ORAL at 13:13

## 2021-01-16 RX ADMIN — SODIUM CHLORIDE, POTASSIUM CHLORIDE, SODIUM LACTATE AND CALCIUM CHLORIDE: 600; 310; 30; 20 INJECTION, SOLUTION INTRAVENOUS at 11:06

## 2021-01-16 RX ADMIN — Medication 4 MG: at 12:09

## 2021-01-16 RX ADMIN — LIDOCAINE HYDROCHLORIDE 30 MG: 10 INJECTION, SOLUTION INFILTRATION; PERINEURAL at 11:12

## 2021-01-16 RX ADMIN — LEVOTHYROXINE SODIUM 175 MCG: 0.17 TABLET ORAL at 08:23

## 2021-01-16 RX ADMIN — SODIUM CHLORIDE 200 ML: 900 IRRIGANT IRRIGATION at 12:13

## 2021-01-16 RX ADMIN — FENTANYL CITRATE 50 MCG: 50 INJECTION, SOLUTION INTRAMUSCULAR; INTRAVENOUS at 12:17

## 2021-01-16 RX ADMIN — ROCURONIUM BROMIDE 50 MG: 10 INJECTION INTRAVENOUS at 11:12

## 2021-01-16 RX ADMIN — KETOROLAC TROMETHAMINE 30 MG: 30 INJECTION, SOLUTION INTRAMUSCULAR at 12:39

## 2021-01-16 RX ADMIN — AMPICILLIN SODIUM AND SULBACTAM SODIUM 3 G: 2; 1 INJECTION, POWDER, FOR SOLUTION INTRAMUSCULAR; INTRAVENOUS at 01:48

## 2021-01-16 RX ADMIN — FENTANYL CITRATE 50 MCG: 50 INJECTION, SOLUTION INTRAMUSCULAR; INTRAVENOUS at 12:26

## 2021-01-16 RX ADMIN — BUPIVACAINE HYDROCHLORIDE AND EPINEPHRINE BITARTRATE 22 ML: 5; .005 INJECTION, SOLUTION EPIDURAL; INTRACAUDAL; PERINEURAL at 12:07

## 2021-01-16 RX ADMIN — ACETAMINOPHEN 975 MG: 325 TABLET, FILM COATED ORAL at 13:13

## 2021-01-16 RX ADMIN — GLYCOPYRROLATE 0.6 MG: 0.2 INJECTION, SOLUTION INTRAMUSCULAR; INTRAVENOUS at 12:09

## 2021-01-16 RX ADMIN — AMPICILLIN SODIUM AND SULBACTAM SODIUM 3 G: 2; 1 INJECTION, POWDER, FOR SOLUTION INTRAMUSCULAR; INTRAVENOUS at 08:23

## 2021-01-16 RX ADMIN — DEXAMETHASONE SODIUM PHOSPHATE 4 MG: 4 INJECTION, SOLUTION INTRA-ARTICULAR; INTRALESIONAL; INTRAMUSCULAR; INTRAVENOUS; SOFT TISSUE at 11:12

## 2021-01-16 RX ADMIN — FENTANYL CITRATE 50 MCG: 50 INJECTION, SOLUTION INTRAMUSCULAR; INTRAVENOUS at 11:45

## 2021-01-16 RX ADMIN — CEFAZOLIN SODIUM 2 G: 2 INJECTION, SOLUTION INTRAVENOUS at 11:06

## 2021-01-16 SDOH — HEALTH STABILITY: MENTAL HEALTH: CURRENT SMOKER: 0

## 2021-01-16 ASSESSMENT — ACTIVITIES OF DAILY LIVING (ADL)
ADLS_ACUITY_SCORE: 10

## 2021-01-16 NOTE — DISCHARGE SUMMARY
[facility]  Discharge Summary  Name: Lisa Betancourt    MRN: [id number]  YOB: 1970    Age: 50 year old  Date of Discharge:  1/16/2021  Date of Admission: 1/14/2021  Primary Care Provider: Jessica Mcgrath  Discharge Physician:  Deric Sosa MD  Discharging Service:  Hospitalist      Discharge Diagnosis:  Choledocholithiasis status post ERCP, sphincterectomy and stone extraction  Symptomatic cholelithiasis, status post laparoscopic cholecystectomy  History of RA on immunosuppressants with Humira and methotrexate     Other Diagnosis:  Past Medical History:   Diagnosis Date     Hypothyroid      Renal disease      Rheumatoid arthritis(714.0)           Discharge Disposition:  Discharged to home     Allergies:  No Known Allergies     Discharge Medications:   Current Discharge Medication List      START taking these medications    Details   ondansetron (ZOFRAN-ODT) 4 MG ODT tab Take 1-2 tablets (4-8 mg) by mouth every 8 hours as needed for nausea  Qty: 8 tablet, Refills: 0    Associated Diagnoses: Choledocholithiasis      oxyCODONE (ROXICODONE) 5 MG tablet Take 1-2 tablets (5-10 mg) by mouth every 4 hours as needed for moderate to severe pain  Qty: 20 tablet, Refills: 0    Associated Diagnoses: Choledocholithiasis         CONTINUE these medications which have NOT CHANGED    Details   acetaminophen (TYLENOL) 500 MG tablet Take 1,000 mg by mouth every 6 hours as needed for other (migraine) At the onset of migraine with Rizatriptan       amLODIPine (NORVASC) 10 MG tablet Take 5 mg by mouth daily      biotin 1000 MCG TABS tablet Take 1,000 mcg by mouth daily      calcium citrate (CITRACAL) 950 MG tablet Take 2 tablets by mouth daily       cetirizine (ZYRTEC) 10 MG tablet Take 10 mg by mouth daily      fluticasone (FLONASE) 50 MCG/ACT nasal spray Spray 2 sprays into both nostrils daily as needed for rhinitis or allergies      folic acid (FOLVITE) 1 MG tablet Take 1 mg by mouth daily     "  Levothyroxine Sodium (SYNTHROID PO) Take 175 mcg by mouth daily       multivitamin w/minerals (MULTI-VITAMIN) tablet Take 1 tablet by mouth daily      rizatriptan (MAXALT) 10 MG tablet Take 10 mg by mouth at onset of headache for migraine Repeat Q2H PRN x 2 more doses. Max 30 mg in 24 hours      Vitamin D, Cholecalciferol, 25 MCG (1000 UT) TABS Take 2,000 Units by mouth daily      adalimumab (HUMIRA) 40 MG/0.8ML syr kit Inject 40 mg Subcutaneous every 14 days      cyanocobalamin (VITAMIN B-12) 1000 MCG tablet Take 1,000 mcg by mouth once a week      methotrexate 2.5 MG tablet Take 12.5 mg by mouth once a week              Condition on Discharge:  Discharge condition: Stable   Discharge vitals: Blood pressure 104/53, pulse 58, temperature 97  F (36.1  C), temperature source Temporal, resp. rate 10, height 1.676 m (5' 6\"), weight 84.5 kg (186 lb 3.2 oz), SpO2 100 %, not currently breastfeeding.   Code status on discharge: Full Code     History of Present Illness:  See detailed admission note for full details.        Significant Physical Exam Findings Day of Discharge:  HEENT; Atraumatic, normocephalic, pinkish conjuctiva, pupils bilateral reactive   Skin: warm and moist, no rashes  Lungs: equal chest expansion, clear to auscultation, no wheezes, no stridor, no crackles,   Heart: normal rate, normal rhythm, no rubs or gallops.   Abdomen: normal bowel sounds, no tenderness, no peritoneal signs, no guarding  Extremities: no deformities, no edema   Neuro; follow commands, alert and oriented x3, spontaneous speech, coherent, moves all extremities spontaneously  Psych; no hallucination, euthymic mood, not agitated        Procedures other than Imaging:  As listed above     Imaging:  Results for orders placed or performed during the hospital encounter of 01/14/21   CT Abdomen Pelvis w Contrast    Narrative    EXAM: CT ABDOMEN PELVIS W CONTRAST  LOCATION: [facility]  DATE/TIME: 1/14/2021 8:45 PM    INDICATION: " Abdominal pain and vomiting. Gastric sleeve procedure one year ago.  COMPARISON: CT abdomen pelvis 03/26/2020  TECHNIQUE: CT scan of the abdomen and pelvis was performed following injection of IV contrast. Multiplanar reformats were obtained. Dose reduction techniques were used.  CONTRAST: 85 mL Isovue-370    FINDINGS:   LOWER CHEST: Scarring or atelectasis right lower lobe medially adjacent to the spine, unchanged. Small esophageal hiatal hernia.    HEPATOBILIARY: The liver is unremarkable. Moderate distention of the gallbladder. The common bile duct is dilated measuring 10 mm. No evidence for distal obstructing stone or mass.    PANCREAS: Normal.    SPLEEN: Normal.    ADRENAL GLANDS: Normal.    KIDNEYS/BLADDER: Normal.    BOWEL: Gastric sleeve procedure. Moderate gastric distention. Numerous slightly distended loops of small bowel within the abdomen and pelvis, but no findings for obstruction. Colon is of normal caliber. No inflammatory changes.    LYMPH NODES: Normal.    VASCULATURE: Unremarkable.    PELVIC ORGANS: 3 cm right adnexal cyst and trace free pelvic fluid. Cervical nabothian cyst.    MUSCULOSKELETAL: Degenerative disc and facet disease L5 level.      Impression    IMPRESSION:   1.  Gastric sleeve procedure, with moderate gastric distention.  2.  Dilated common bile duct is more prominent compared to the previous exam. No obvious obstructing lesion. Correlation with liver function tests recommended.  3.  Numerous slightly distended loops of small bowel within the left abdomen and pelvis, nonspecific. No inflammatory changes or other evidence for obstruction.   4.  Right adnexal cyst, presumed physiologic. Please see guidelines below for follow-up.  5.  Cervical nabothian cyst.    REFERENCE:  Guidelines for incidental benign or probably benign adnexal cyst on CT/MRI.  Managing Incidental Findings on Abdominal and Pelvic CT and MRI, Part 1: White Paper of the ACR Incidental Findings Committee II on  Adnexal Findings. J Am Elizabeth Radiol 2013;10:675-681.    Premenopausal:  Less than 5 cm: Benign, no follow up.    Greater than 5 cm: US follow up in 8 weeks.       US Abdomen Limited    Narrative    EXAM: US ABDOMEN LIMITED  LOCATION: [facility]  DATE/TIME: 1/14/2021 10:30 PM    INDICATION: Right upper quadrant pain and vomiting.  COMPARISON: CT from 1/14/2021.  TECHNIQUE: Limited abdominal ultrasound.    FINDINGS:    GALLBLADDER: Multiple small stones in the gallbladder as well as sludge material. No gallbladder wall thickening. Sonographic Jasso sign is negative. Small amount of pericholecystic fluid.    BILE DUCTS: No common bile duct is dilated up to 1.0 cm. There is limited visualization of the distal common bile duct, however, there is questionable possible distal common bile duct stone although not well seen. MRCP is recommended in further   evaluation. Mild intrahepatic biliary dilatation is also present.    LIVER: Normal parenchyma with smooth contour. No focal mass.    RIGHT KIDNEY: No hydronephrosis.    PANCREAS: The visualized portions are normal.    No ascites.      Impression    IMPRESSION:  1.  Common bile duct is dilated up to 1.0 cm and there is a possible but not definitive distal common bile duct stone. Given these findings, recommend further evaluation with MRCP.    2.  Multiple gallstones as well as sludge in the gallbladder. No definite gallbladder wall thickening and sonographic Jasso sign is negative. However, there is a small amount of pericholecystic fluid. Ultimately, findings are somewhat equivocal but felt   to be less likely due to cholecystitis at this time. If patient's symptoms persist or progress, follow-up repeat ultrasound would be helpful in further evaluation.    3.  Remainder of the right upper quadrant ultrasound is negative.   XR ERCP    Narrative    This exam was marked as non-reportable because it will not be read by a   radiologist or a [facility]  non-radiologist provider.                Consultations:  Consultation during this admission received from gastroenterology and surgery.     Recent Lab Results:  Recent Labs   Lab 01/16/21  0832 01/15/21  1011 01/14/21 1939   WBC 12.0*  --  13.1*   HGB 12.2  --  13.0   HCT 37.0  --  39.2   MCV 98  --  98    248 268     Recent Labs   Lab 01/15/21  0103 01/15/21  0054   CULT No growth after 1 day No growth after 1 day     Recent Labs   Lab 01/16/21  0832 01/14/21  1939    141   POTASSIUM 3.8 3.7   CHLORIDE 113* 113*   CO2 26 24   ANIONGAP 2* 4   GLC 85 97   BUN 8 16   CR 0.93 0.97   GFRESTIMATED 73 69   GFRESTBLACK 84 80   MYRA 8.3* 8.5   PROTTOTAL 6.3* 7.3   ALBUMIN 3.0* 3.5   BILITOTAL 0.4 0.3   ALKPHOS 65 86   AST 53* 106*   * 164*     Recent Labs   Lab 01/16/21  0832 01/14/21  1939   GLC 85 97     No results for input(s): LACT in the last 168 hours.  No results for input(s): TROPONIN, TROPI, TROPR in the last 168 hours.    Invalid input(s): TROP, TROPONINIES  No results for input(s): COLOR, APPEARANCE, URINEGLC, URINEBILI, URINEKETONE, SG, UBLD, URINEPH, PROTEIN, UROBILINOGEN, NITRITE, LEUKEST, RBCU, WBCU in the last 168 hours.       Pending Results:    Unresulted Labs Ordered in the Past 30 Days of this Admission     Date and Time Order Name Status Description    1/16/2021 1209 Surgical pathology exam In process     1/15/2021 0049 Blood culture Preliminary     1/15/2021 0030 Blood culture Preliminary            Discharge Instructions and Follow-Up:   Discharge diet: Orders Placed This Encounter      Diet     Discharge activity: Activity as tolerated   Discharge follow-up: 1-2 weeks with PCP  Follow-up with GI and general surgery as scheduled   Outpatient therapy: None    Other instructions:  Per dietary instructions from surgical service     Hospital Course:  Lisa is a 42-year-old very pleasant lady, [employer], on Humira and methotrexate for RA presented with nausea, abdominal pain and vomiting  found with choledocholithiasis and symptomatic cholelithiasis and underwent ERCP with sphincterectomy and stone extraction which she tolerated the procedure well and subsequently underwent laparoscopic cholecystectomy as well today with no reported events or complications.  She remained afebrile, trending down of her LFTs, stable hemodynamics, requesting for home discharge and I was informed that she will be discharged from PACU after surgical intervention or recovery.  Defer pain regimen as prescribed by general surgery service.  Resumption of chronic regular home regimen upon discharge.         Total time spent in face to face contact with the patient and coordinating discharge was:  > 30 Minutes.

## 2021-01-16 NOTE — DISCHARGE INSTRUCTIONS
You received Toradol, an IV form of Ibuprofen (Motrin) at 12:40 PM.  Do not take any Ibuprofen products until 6:40 PM.  Rotate Tylenol and Ibuprofen products for good pain control; take the Oxycodone (stronger pain pill) for more severe pain.     HOME CARE FOLLOWING LAPAROSCOPIC CHOLECYSTECTOMY  LORRAINE Tamayo, TWIN Ramirez, IAN Us, ALFRED León, EFFIE Osorio    INCISIONAL CARE:  Replace the bandage over your incisions DAILY until all drainage stops, or if more comfortable to have in place.  If present, leave the steri-strips (white paper tapes) in place for 14 days after surgery.  If Dermabond (a type of skin glue) is present, leave in place until it wears/flakes off (2-3 weeks).     BATHING:  OK to shower 48 hours after surgery.  Avoid baths for 1 week after surgery.  You may wash your hair at any time.  Gently pat your incision dry after bathing.  Do not apply lotions, creams, or ointments to incisions.    ACTIVITY:  Light Activity -- you may immediately be up and about as tolerated.  Walking is encouraged, increase as tolerated.  Driving/Light Work-- when comfortable and off narcotic pain medications.  Strenuous Work/Activity -- limit lifting to 20 pounds for 2 weeks.  Progressively increase with time.  Active Sports (running, biking, etc.) -- cautiously resume after 2 weeks.    DISCOMFORT:  Local anesthetic placed at surgery should provide relief for 4-8 hours.  Begin taking pain pills before discomfort is severe.  Take the pain medication with some food, when possible, to minimize side effects.  Intermittent use of ice packs may help during the first 1-3 weeks after surgery.  Expect gradual improvement.    Over-the-counter anti-inflammatory medications (i.e. Ibuprofen/Advil/Motrin or Naprosyn/Aleve) may be used per package instructions in addition to or while tapering off the narcotic pain medications to decrease swelling and sensitivity.  DO NOT TAKE these Anti-inflammatory medications if your  primary physician has advised against doing so, or if you have acid reflux, ulcer, or bleeding disorder, or take blood-thinner medications.  Call your primary physician or the surgery office if you have medication questions.    After laparoscopic cholecystectomy, you may have shoulder or upper back discomfort due to the gas used during surgery.  This is temporary and should resolve within 2-3 days.  Frequent short walks may help with this.  You may have decreased energy level for 1-2 weeks after surgery related to your recovery.    DIET:  Start with liquids and gradually increase diet as tolerated.  Drink plenty of fluids.  While taking pain medications, consider use of a stool softener, increase your fiber in your diet, or add a fiber supplement (like Metamucil, Citrucel) to help prevent constipation - a possible side effect of pain medications.  It is not uncommon to experience some bowel changes (loose stools or constipation) after surgery.  Your body has to adapt to you no longer having a gall bladder.  To help minimize this side effect, avoid fatty foods for 1-2 weeks after surgery.  You may then slowly increase the amount of fatty foods in your diet.      NAUSEA:  If nauseated from the anesthetic/pain meds; rest in bed, get up cautiously with assistance, and drink clear liquids (juice, tea, broth).    FOLLOW-UP AFTER SURGERY:  -Our office will contact you approximately 2-3 weeks after surgery to check on your progress and answer any questions you may have.  If you are doing well, you will not need to return for an office appointment.  If any concerns are identified over the phone, we will help you make an appointment to see a provider.    -If you have not received a phone call, have any questions or concerns, or would like to be seen, please call us at [phone].  We are located at: [address] [facility], [address]; [place] [address]    -CONTACT US IF THE FOLLOWING DEVELOPS:   1. A fever that is above 101      2. Increased redness, warmth, drainage, bleeding, or swelling.   3. Pain that is not relieved by rest/ice and your prescription.   4.  Increasing pain after 48 hours.   5. Drainage that is thick, cloudy, yellow, green or white.   6. Any other questions or concerns.      FREQUENTLY ASKED QUESTIONS:    Q:  How should my incision look?    A:  Normally your incision will appear slightly swollen with light redness directly along the incision itself as it heals.  It may feel like a bump or ridge as the healing/scarring happens, and over time (3-4 months) this bump or ridge feeling should slowly go away.  In general, clear or pink watery drainage can be normal at first as your incision heals, but should decrease over time.    Q:  How do I know if my incision is infected?  A:  Look at your incision for signs of infection, like redness around the incision spreading to surrounding skin, or drainage of cloudy or foul-smelling drainage.  If you feel warm, check your temperature to see if you are running a fever.    **If any of these things occur, please notify the nurse at our office.  We may need you to come into the office for an incision check.      Q:  How do I take care of my incision?  A:  If you have a dressing in place - Starting the day after surgery, replace the dressing 1-2 times a day until there is no further drainage from the incision.  At that time, a dressing is no longer needed.  Try to minimize tape on the skin if irritation is occurring at the tape sites.  If you have significant irritation from tape on the skin, please call the office to discuss other method of dressing your incision.    Small pieces of tape called  steri-strips  may be present directly overlying your incision; these may be removed 10 days after surgery unless otherwise specified by your surgeon.  If these tapes start to loosen at the ends, you may trim them back until they fall off or are removed.    A:  If you had  Dermabond  tissue  glue used as a dressing (this causes your incision to look shiny with a clear covering over it) - This type of dressing wears off with time and does not require more dressings over the top unless it is draining around the glue as it wears off.  Do not apply ointments or lotions over the incisions until the glue has completely worn off.    Q:  There is a piece of tape or a sticky  lead  still on my skin.  Can I remove this?  A:  Sometimes the sticky  leads  used for monitoring during surgery or for evaluation in the emergency department are not all removed while you are in the hospital.  These sometimes have a tab or metal dot on them.  You can easily remove these on your own, like taking off a band-aid.  If there is a gel substance under the  lead , simply wipe/clean it off with a washcloth or paper towel.      Q:  What can I do to minimize constipation (very hard stools, or lack of stools)?  A:  Stay well hydrated.  Increase your dietary fiber intake or take a fiber supplement -with plenty of water.  Walk around frequently.  You may consider an over-the-counter stool-softener.  Your Pharmacist can assist you with choosing one that is stocked at your pharmacy.  Constipation is also one of the most common side effects of pain medication.  If you are using pain medication, be pro-active and try to PREVENT problems with constipation by taking the steps above BEFORE constipation becomes a problem.    Q:  What do I do if I need more pain medications?  A:  Call the office to receive refills.  Be aware that certain pain meds cannot be called into a pharmacy and actually require a paper prescription.  A change may be made in your pain med as you progress thru your recovery period or if you have side effects to certain meds.    --Pain meds are NOT refilled after 5pm on weekdays, and NOT AT ALL on the weekends, so please look ahead to prevent problems.      Q:  Why am I having a hard time sleeping now that I am at home?  A:   Many medications you receive while you are in the hospital can impact your sleep for a number of days after your surgery/hospitalization.  Decreased level of activity and naps during the day may also make sleeping at night difficult.  Try to minimize day-time naps, and get up frequently during the day to walk around your home during your recovery time.  Sleep aides may be of some help, but are not recommended for long-term use.      Q:  I am having some back discomfort.  What should I do?  A:  This may be related to certain positioning that was required for your surgery, extended periods of time in bed, or other changes in your overall activity level.  You may try ice, heat, acetaminophen, or ibuprofen to treat this temporarily.  Note that many pain medications have acetaminophen in them and would state this on the prescription bottle.  Be sure not to exceed the maximum of 4000mg per day of acetaminophen.     **If the pain you are having does not resolve, is severe, or is a flare of back pain you have had on other occasions prior to surgery, please contact your primary physician for further recommendations or for an appointment to be examined at their office.    Q:  Why am I having headaches?  A:  Headaches can be caused by many things:  caffeine withdrawal, use of pain meds, dehydration, high blood pressure, lack of sleep, over-activity/exhaustion, flare-up of usual migraine headaches.  If you feel this is related to muscle tension (a band-like feeling around the head, or a pressure at the low-back of the head) you may try ice or heat to this area.  You may need to drink more fluids (try electrolyte drink like Gatorade), rest, or take your usual migraine medications.   **If your headaches do not resolve, worsen, are accompanied by other symptoms, or if your blood pressure is high, please call your primary physician for recommendation and/or examination.    Q:  I am unable to urinate.  What do I do?  A:  A small  percentage of people can have difficulty urinating initially after surgery.  This includes being able to urinate only a very small amount at a time and feeling discomfort or pressure in the very low abdomen.  This is called  urinary retention , and is actually an urgent situation.  Proceed to your nearest Emergency department for evaluation (not an Urgent Care Center).  Sometimes the bladder does not work correctly after certain medications you receive during surgery, or related to certain procedures.  You may need to have a catheter placed until your bladder recovers.  When planning to go to an Emergency department, it may help to call the ER to let them know you are coming in for this problem after a surgery.  This may help you get in quicker to be evaluated.  **If you have symptoms of a urinary tract infection, please contact your primary physician for the proper evaluation and treatment.    If you have other questions, please call the office Monday thru Friday between 8am and 5pm to discuss with the nurse or physician assistant.  [phone]    There is a surgeon ON CALL on weekday evenings and over the weekend in case of urgent need only, and may be contacted at the same number.    If you are having an emergency, call 911 or proceed to your nearest emergency department.    GENERAL ANESTHESIA OR SEDATION ADULT DISCHARGE INSTRUCTIONS   SPECIAL PRECAUTIONS FOR 24 HOURS AFTER SURGERY    IT IS NOT UNUSUAL TO FEEL LIGHT-HEADED OR FAINT, UP TO 24 HOURS AFTER SURGERY OR WHILE TAKING PAIN MEDICATION.  IF YOU HAVE THESE SYMPTOMS; SIT FOR A FEW MINUTES BEFORE STANDING AND HAVE SOMEONE ASSIST YOU WHEN YOU GET UP TO WALK OR USE THE BATHROOM.    YOU SHOULD REST AND RELAX FOR THE NEXT 24 HOURS AND YOU MUST MAKE ARRANGEMENTS TO HAVE SOMEONE STAY WITH YOU FOR AT LEAST 24 HOURS AFTER YOUR DISCHARGE.  AVOID HAZARDOUS AND STRENUOUS ACTIVITIES.  DO NOT MAKE IMPORTANT DECISIONS FOR 24 HOURS.    DO NOT DRIVE ANY VEHICLE OR  OPERATE MECHANICAL EQUIPMENT FOR 24 HOURS FOLLOWING THE END OF YOUR SURGERY.  EVEN THOUGH YOU MAY FEEL NORMAL, YOUR REACTIONS MAY BE AFFECTED BY THE MEDICATION YOU HAVE RECEIVED.    DO NOT DRINK ALCOHOLIC BEVERAGES FOR 24 HOURS FOLLOWING YOUR SURGERY.    DRINK CLEAR LIQUIDS (APPLE JUICE, GINGER ALE, 7-UP, BROTH, ETC.).  PROGRESS TO YOUR REGULAR DIET AS YOU FEEL ABLE.    YOU MAY HAVE A DRY MOUTH, A SORE THROAT, MUSCLES ACHES OR TROUBLE SLEEPING.  THESE SHOULD GO AWAY AFTER 24 HOURS.    CALL YOUR DOCTOR FOR ANY OF THE FOLLOWING:  SIGNS OF INFECTION (FEVER, GROWING TENDERNESS AT THE SURGERY SITE, A LARGE AMOUNT OF DRAINAGE OR BLEEDING, SEVERE PAIN, FOUL-SMELLING DRAINAGE, REDNESS OR SWELLING.    IT HAS BEEN OVER 8 TO 10 HOURS SINCE SURGERY AND YOU ARE STILL NOT ABLE TO URINATE (PASS WATER).

## 2021-01-16 NOTE — ANESTHESIA CARE TRANSFER NOTE
Patient: Lisa Betancourt    Procedure(s):  CHOLECYSTECTOMY, LAPAROSCOPIC    Diagnosis: Gallstones [K80.20]  Diagnosis Additional Information: No value filed.    Anesthesia Type:   General     Note:  Airway :Face Mask  Patient transferred to:PACU  Comments: Shaji Report: Identifed the Patient, Identified the Reponsible Provider, Reviewed the pertinent medical history, Discussed the surgical course, Reviewed Intra-OP anesthesia mangement and issues during anesthesia, Set expectations for post-procedure period and Allowed opportunity for questions and acknowledgement of understanding      Vitals: (Last set prior to Anesthesia Care Transfer)    CRNA VITALS  1/16/2021 1148 - 1/16/2021 1222      1/16/2021             SpO2:  99 %                Electronically Signed By: SKYE Montez CRNA  January 16, 2021  12:22 PM

## 2021-01-16 NOTE — ANESTHESIA POSTPROCEDURE EVALUATION
Patient: Lisa Betancourt    Procedure(s):  CHOLECYSTECTOMY, LAPAROSCOPIC    Diagnosis:Gallstones [K80.20]  Diagnosis Additional Information: No value filed.    Anesthesia Type:  General    Note:  Anesthesia Post Evaluation    Patient location during evaluation: PACU  Patient participation: Able to fully participate in evaluation  Level of consciousness: awake and alert  Pain management: adequate  multimodal analgesia used between 6 hours prior to anesthesia start to PACU dischargeAirway patency: patent  Cardiovascular status: acceptable  Respiratory status: acceptable  two or more mitigation strategies used for obstructive sleep apneaHydration status: acceptable  PONV: none     Anesthetic complications: None          Last vitals:  Vitals:    01/16/21 1315 01/16/21 1330 01/16/21 1345   BP: 107/62 112/60 124/75   Pulse: 67 57 68   Resp: 21 15 18   Temp: 97.9  F (36.6  C)  97.3  F (36.3  C)   SpO2: 98% 99% 100%         Electronically Signed By: Rene Zepeda MD  January 16, 2021  4:23 PM

## 2021-01-16 NOTE — ANESTHESIA PROCEDURE NOTES
Airway   Date/Time: 1/16/2021 11:14 AM   Patient location during procedure: OR    Staff -   CRNA: Nola Melgoza APRN CRNA  Performed By: CRNA    Consent for Airway   Urgency: emergent    Indications and Patient Condition  Indications for airway management: delfina-procedural  Induction type:intravenousMask difficulty assessment: 1 - vent by mask    Final Airway Details  Final airway type: endotracheal airway  Successful airway:ETT - single  Endotracheal Airway Details   ETT size (mm): 7.0  Successful intubation technique: direct laryngoscopy  Grade View of Cords: 2  Adjucts: stylet  Secured with: plastic tape  Bite block used: Soft    Post intubation assessment   Placement verified by: capnometry   Number of attempts at approach: 1  Secured with:plastic tape  Ease of procedure: easy  Dentition: Intact

## 2021-01-16 NOTE — ANESTHESIA POSTPROCEDURE EVALUATION
Patient: Lisa Betancourt    Procedure(s):  ENDOSCOPIC RETROGRADE CHOLANGIOPANCREATOGRAPHY, SPINCTEROTOMY AND STONE EXTRACTION    Diagnosis:Cholecystitis [K81.9]  Diagnosis Additional Information: No value filed.    Anesthesia Type:  General    Note:  Anesthesia Post Evaluation    Patient location during evaluation: PACU  Patient participation: Able to fully participate in evaluation  Level of consciousness: awake  Pain management: adequate  Airway patency: patent  Cardiovascular status: acceptable  Respiratory status: acceptable  Hydration status: euvolemic  PONV: controlled     Anesthetic complications: None          Last vitals:  Vitals:    01/15/21 1615 01/15/21 1641 01/15/21 1745   BP: 115/66 118/72 125/78   Pulse: 52 51 55   Resp: 18 18 18   Temp: 98.3  F (36.8  C) 98.1  F (36.7  C)    SpO2: 98% 98% 98%         Electronically Signed By: Rod Luu MD  January 15, 2021  6:18 PM

## 2021-01-16 NOTE — OP NOTE
General Surgery Operative Note    Pre-operative diagnosis:  Gallstones [K80.20], choledocholithiasis   Post-operative diagnosis:  Same   Procedure: Laparoscopic Cholecystectomy   Surgeon: Edwardo Buchanan MD   Assistant(s): Luh Fleming PA-C - the physician assistant was medically necessary to assist in prepping, positioning, camera operation, retraction/exposure and closure of the port site.    Anesthesia: General    Estimated blood loss: 5 cc's   Drains placed: None   Complications:  None   Findings:   Gallbladder with some edema in the gallbladder wall.  The anatomy was clearly visualized.     INDICATIONS FOR OPERATION: This is a patient with upper abdominal pain and a dilated common bile duct.  The patient underwent an ERCP for choledocholithiasis yesterday.  Laparoscopic cholecystectomy was recommended by my partner Dr. Browne..  The procedure along with its risks and complications was discussed in detail and the patient agreed to proceed.    DETAILS OF THE OPERATION: After informed consent the patient was taken to the operating room where she underwent satisfactory induction of general anesthesia.  The patient was then sterilely prepped and draped.  A low epigastric skin incision was made using a skin knife to open patient's previous incisions.  The dissection was carried bluntly down to the fascia.  The fascia was opened using electrocautery and the Higgins trocar was then inserted.  Pneumoperitoneum was achieved using CO2 insufflation, and under direct visualization  three  5 mm abdominal ports were placed, 1 just lateral to the umbilicus and 2 further on the right side..  The gallbladder was visualized and was grasped.  There appeared to be some edema of the gallbladder wall.  It was pulled up over the liver and the cystic duct was exposed.  The cystic duct was skeletonized, triple clipped and divided.  The cystic artery was likewise triple clipped and divided, along with any posterior branches.  The  gallbladder was then dissected away from the liver using electrocautery.  The gallbladder was then placed in an Endo Catch bag and removed through the supraumbilical incision.  The gallbladder fossa was irrigated out.  There was excellent hemostasis and the clips were in good position.  The trocar sites were now infiltrated with half percent Marcaine with epinephrine.  The trochars were removed under direct visualization.  The supraumbilical fascia was then closed using 0 Vicryl suture.  Skin incisions were closed using 4-0 subcuticular Vicryl followed by Steri-Strips.    The patient was transferred to the recovery room in satisfactory condition.  Sponge and needle counts were correct at the close of the case.    Specimens:   ID Type Source Tests Collected by Time Destination   A : Gallbladder and contents Tissue Gallbladder and Contents SURGICAL PATHOLOGY EXAM Edwardo Buchanan MD 1/16/2021 12:09 PM            Edwardo Buchanan MD

## 2021-01-16 NOTE — PLAN OF CARE
Pertinent assessments: VSS on room air. Afebrile. PRN Dilaudid 0.4mg given x1 for RUQ pain. Pt is up with stand by assist. Tolerating clears but small appetite. Unasyn given q6h.     Major Shift Events: Return to floor after ERCP. New piv placed.  Treatment Plan: GI & surgery consults, Clears. IVF, IV Unasyn, pain management.

## 2021-01-16 NOTE — ANESTHESIA PREPROCEDURE EVALUATION
Anesthesia Pre-Procedure Evaluation    Patient: Lisa Betancourt   MRN: [id number] : 1970          Preoperative Diagnosis: Gallstones [K80.20]    Procedure(s):  CHOLECYSTECTOMY, LAPAROSCOPIC    Past Medical History:   Diagnosis Date     Hypothyroid      Renal disease      Rheumatoid arthritis(714.0)      Past Surgical History:   Procedure Laterality Date     BREAST LUMPECTOMY, RT/LT      Breast Lumpectomy LT     C/SECTION, LOW TRANSVERSE      , Low Transverse     COMBINED CYSTOSCOPY, INSERT STENT URETER(S) Left 3/26/2020    Procedure: Video cystoscopy, left double-J stent placement (5-Kinyarwanda x 24 cm);  Surgeon: Torres Ware MD;  Location: [facility] OR     HERNIA REPAIR, UMBILICAL  2002     LASER HOLMIUM LITHOTRIPSY URETER(S), INSERT STENT, COMBINED Left 2020    Procedure: Video cystoscopy, left double-J stent removal, flexible left ureteroscopy and rhinoscopy with stone extraction. (laser on standby only) ;  Surgeon: Torres Ware MD;  Location: [facility] OR     TUBAL LIGATION       Anesthesia Evaluation     . Pt has had prior anesthetic. Type: General    No history of anesthetic complications          ROS/MED HX    ENT/Pulmonary:  - neg pulmonary ROS     Neurologic:  - neg neurologic ROS     Cardiovascular:  - neg cardiovascular ROS       METS/Exercise Tolerance:     Hematologic:  - neg hematologic  ROS       Musculoskeletal:  - neg musculoskeletal ROS       GI/Hepatic:     (+) cholecystitis/cholelithiasis,       Renal/Genitourinary:     (+) Nephrolithiasis ,       Endo:     (+) thyroid problem hypothyroidism, Obesity, .      Psychiatric:  - neg psychiatric ROS       Infectious Disease:  - neg infectious disease ROS       Malignancy:         Other:    - neg other ROS                      Physical Exam  Normal systems: cardiovascular, pulmonary and dental    Airway   Mallampati: II  TM distance: >3 FB  Neck ROM: full    Dental     Cardiovascular       Pulmonary             Lab Results   Component  "Value Date    WBC 12.0 (H) 01/16/2021    HGB 12.2 01/16/2021    HCT 37.0 01/16/2021     01/16/2021     01/16/2021    POTASSIUM 3.8 01/16/2021    CHLORIDE 113 (H) 01/16/2021    CO2 26 01/16/2021    BUN 8 01/16/2021    CR 0.93 01/16/2021    GLC 85 01/16/2021    MYRA 8.3 (L) 01/16/2021    ALBUMIN 3.0 (L) 01/16/2021    PROTTOTAL 6.3 (L) 01/16/2021     (H) 01/16/2021    AST 53 (H) 01/16/2021    ALKPHOS 65 01/16/2021    BILITOTAL 0.4 01/16/2021    LIPASE 164 01/14/2021    INR 1.11 01/15/2021       Preop Vitals  BP Readings from Last 3 Encounters:   01/16/21 114/67   05/12/20 118/79   03/28/20 122/69    Pulse Readings from Last 3 Encounters:   01/16/21 61   05/12/20 61   03/27/20 64      Resp Readings from Last 3 Encounters:   01/16/21 16   05/12/20 16   03/28/20 16    SpO2 Readings from Last 3 Encounters:   01/16/21 100%   05/12/20 100%   03/28/20 97%      Temp Readings from Last 1 Encounters:   01/16/21 98.2  F (36.8  C) (Oral)    Ht Readings from Last 1 Encounters:   01/15/21 1.676 m (5' 6\")      Wt Readings from Last 1 Encounters:   01/15/21 84.5 kg (186 lb 3.2 oz)    Estimated body mass index is 30.05 kg/m  as calculated from the following:    Height as of this encounter: 1.676 m (5' 6\").    Weight as of this encounter: 84.5 kg (186 lb 3.2 oz).       Anesthesia Plan      History & Physical Review  History and physical reviewed and following examination; no interval change.    ASA Status:  2 .    NPO Status:  > 8 hours    Plan for General with Intravenous induction. Maintenance will be Balanced.    PONV prophylaxis:  Ondansetron (or other 5HT-3) and Dexamethasone or Solumedrol    The patient is not a current smoker      Postoperative Care  Postoperative pain management:  IV analgesics, Oral pain medications and Multi-modal analgesia.      Consents  Anesthetic plan, risks, benefits and alternatives discussed with:  Patient..                 Rene Zepeda MD                    .  "

## 2021-01-16 NOTE — PROGRESS NOTES
End of Shift Summary  For vital signs and complete assessments, please see documentation flowsheets.     Pertinent assessments: Pt A&O, denies any pain/nausea. Soft BP's, on RA. BS faint, hypoactive. Voiding without difficulty.     Major Shift Events: NPO for emilee grimme @ noon  Treatment Plan: IVF, IV Unasyn, IV Pepcid, pain/nausea management  Bedside Nurse: Trina Renee RN

## 2021-01-19 LAB — COPATH REPORT: NORMAL

## 2021-02-03 ENCOUNTER — TELEPHONE (OUTPATIENT)
Dept: SURGERY | Facility: CLINIC | Age: 51
End: 2021-02-03

## 2021-02-03 NOTE — TELEPHONE ENCOUNTER
Attempted to call patient for post op check.  No answer.  Message was left for patient to call back if they had any questions of concerns.     Luh Fleming PA-C

## 2021-08-27 ENCOUNTER — LAB REQUISITION (OUTPATIENT)
Dept: LAB | Facility: CLINIC | Age: 51
End: 2021-08-27

## 2021-08-27 PROCEDURE — 86481 TB AG RESPONSE T-CELL SUSP: CPT | Performed by: INTERNAL MEDICINE

## 2021-08-30 LAB
QUANTIFERON MITOGEN: 10 IU/ML
QUANTIFERON NIL TUBE: 0.05 IU/ML
QUANTIFERON TB1 TUBE: 0.28 IU/ML
QUANTIFERON TB2 TUBE: 0.22

## 2021-08-31 LAB
GAMMA INTERFERON BACKGROUND BLD IA-ACNC: 0.05 IU/ML
M TB IFN-G BLD-IMP: NEGATIVE
M TB IFN-G CD4+ BCKGRND COR BLD-ACNC: 9.95 IU/ML
MITOGEN IGNF BCKGRD COR BLD-ACNC: 0.17 IU/ML
MITOGEN IGNF BCKGRD COR BLD-ACNC: 0.23 IU/ML

## 2021-09-11 ENCOUNTER — HEALTH MAINTENANCE LETTER (OUTPATIENT)
Age: 51
End: 2021-09-11

## 2022-01-01 ENCOUNTER — HEALTH MAINTENANCE LETTER (OUTPATIENT)
Age: 52
End: 2022-01-01

## 2022-02-26 ENCOUNTER — HEALTH MAINTENANCE LETTER (OUTPATIENT)
Age: 52
End: 2022-02-26

## 2022-10-29 ENCOUNTER — HEALTH MAINTENANCE LETTER (OUTPATIENT)
Age: 52
End: 2022-10-29

## 2023-04-08 ENCOUNTER — HEALTH MAINTENANCE LETTER (OUTPATIENT)
Age: 53
End: 2023-04-08

## (undated) DEVICE — SU VICRYL 0 UR-6 27" J603H

## (undated) DEVICE — ESU CORD MONOPOLAR 10'  E0510

## (undated) DEVICE — DECANTER VIAL 2006S

## (undated) DEVICE — LINEN HALF SHEET 5512

## (undated) DEVICE — BAG URINARY DRAIN 4000ML LF 153509

## (undated) DEVICE — GLOVE PROTEXIS POWDER FREE 8.0 ORTHOPEDIC 2D73ET80

## (undated) DEVICE — DRAPE C-ARM 60X42" 1013

## (undated) DEVICE — BAG CLEAR TRASH 1.3M 39X33" P4040C

## (undated) DEVICE — TUBING IRRIG TUR Y TYPE 96" LF 6543-01

## (undated) DEVICE — ENDO TROCAR SLEEVE KII Z-THREADED 05X100MM CTS02

## (undated) DEVICE — GLOVE PROTEXIS BLUE W/NEU-THERA 6.5  2D73EB65

## (undated) DEVICE — SOL NACL 0.9% IRRIG 1000ML BOTTLE 07138-09

## (undated) DEVICE — CUP AND LID 2PK 2OZ STERILE  SSK9006A

## (undated) DEVICE — LINEN FULL SHEET 5511

## (undated) DEVICE — PAD CHUX UNDERPAD 30X36" P3036C

## (undated) DEVICE — LINEN POUCH DBL 5427

## (undated) DEVICE — BASIN SET MINOR DISP

## (undated) DEVICE — SUCTION CANISTER MEDIVAC LINER 3000ML W/LID 65651-530

## (undated) DEVICE — PACK CYSTO CUSTOM RIDGES

## (undated) DEVICE — ESU ELEC BLADE 2.75" COATED/INSULATED E1455

## (undated) DEVICE — SOL WATER IRRIG 3000ML BAG 2B7117

## (undated) DEVICE — SOL WATER IRRIG 1000ML BOTTLE 2F7114

## (undated) DEVICE — ENDO TROCAR OPTICAL ACCESS KII Z-THRD 12X100MM C0R85

## (undated) DEVICE — TUBING IV EXTENSION SET ANESTHESIA 34" MLL 2C6227

## (undated) DEVICE — PACK SET-UP STD 9102

## (undated) DEVICE — RX LOCKING DEVICE AND BIOPSY CAP

## (undated) DEVICE — ENDO TROCAR BLUNT TIP KII BALLOON 12X100MM C0R47

## (undated) DEVICE — Device

## (undated) DEVICE — PREP TECHNI-CARE CHLOROXYLENOL 3% 4OZ BOTTLE C222-4ZWO

## (undated) DEVICE — ESU GROUND PAD ADULT W/CORD E7507

## (undated) DEVICE — CATH FOLEY 16FR 5ML LUBRICATH LATEX 0165L16

## (undated) DEVICE — CONNECTOR STOPCOCK 3 WAY MALE LL HI-FLO MX9311L

## (undated) DEVICE — BLADE CLIPPER 3M 9670

## (undated) DEVICE — ENDO POUCH UNIV RETRIEVAL SYSTEM INZII 10MM CD001

## (undated) DEVICE — SOL NACL 0.9% INJ 250ML BAG 2B1322Q

## (undated) DEVICE — SU VICRYL 0 CT-2 CR 8X18" J727D

## (undated) DEVICE — SYR 10ML LL W/O NDL

## (undated) DEVICE — SOL NACL 0.9% IRRIG 3000ML BAG 2B7477

## (undated) DEVICE — SUCTION IRR STRYKERFLOW II W/TIP 250-070-520

## (undated) DEVICE — LINEN TOWEL PACK X5 5464

## (undated) DEVICE — GLOVE PROTEXIS POWDER FREE SMT 7.5  2D72PT75X

## (undated) DEVICE — PACK ERCP CUSTOM RIDGES

## (undated) DEVICE — SOL NACL 0.9% IRRIG 1000ML BOTTLE 2F7124

## (undated) DEVICE — SUCTION LAPAROSCOPIC SMOKE EVAC SYSTEM SEL7000A

## (undated) DEVICE — COVER FOOTSWITCH W/CINCH 20X24" 923267

## (undated) DEVICE — PEN MARKING SKIN W/LABELS 31145884

## (undated) DEVICE — SOL WATER IRRIG 1000ML BOTTLE 07139-09

## (undated) DEVICE — RAD RX ISOVUE 300 (50ML) 61% IOPAMIDOL CHARGE PER ML

## (undated) DEVICE — LINEN BASIC PACK 5468

## (undated) DEVICE — GLOVE PROTEXIS W/NEU-THERA 6.5  2D73TE65

## (undated) DEVICE — SU VICRYL 4-0 PS-2 18" UND J496H

## (undated) DEVICE — SYR 03ML 22GA 1.5" ECLIPSE

## (undated) DEVICE — SYR 01ML 25GA 5/8" TBC

## (undated) DEVICE — BASKET RETRIEVAL 1.9FRX120CM ESCAPE NTNL 4 WIRE 390-201

## (undated) DEVICE — SUCTION CANISTER STRAW 65652-008

## (undated) DEVICE — LINEN TOWEL PACK X10 5473

## (undated) DEVICE — BLADE CLIPPER SGL USE 9680

## (undated) DEVICE — SYR 30ML LL W/O NDL 302832

## (undated) DEVICE — RAD RX OPTIRAY 320 (50ML) IOVERSOL 68% CHARGE PER ML

## (undated) DEVICE — SU VICRYL 4-0 P-3 18" UND J494G

## (undated) DEVICE — SYR 20ML LL W/O NDL

## (undated) DEVICE — GUIDEWIRE URO STR STIFF .035"X150CM NITINOL 150NSS35

## (undated) DEVICE — CATH CHOLANGIOGRAM KUMAR CC-019

## (undated) DEVICE — ENDO TROCAR FIRST ENTRY KII FIOS Z-THRD 05X100MM CTF03

## (undated) DEVICE — ESU PENCIL W/HOLSTER E2350H

## (undated) RX ORDER — ACETAMINOPHEN 325 MG/1
TABLET ORAL
Status: DISPENSED
Start: 2021-01-16

## (undated) RX ORDER — FENTANYL CITRATE 50 UG/ML
INJECTION, SOLUTION INTRAMUSCULAR; INTRAVENOUS
Status: DISPENSED
Start: 2020-03-26

## (undated) RX ORDER — PROPOFOL 10 MG/ML
INJECTION, EMULSION INTRAVENOUS
Status: DISPENSED
Start: 2021-01-15

## (undated) RX ORDER — DEXAMETHASONE SODIUM PHOSPHATE 4 MG/ML
INJECTION, SOLUTION INTRA-ARTICULAR; INTRALESIONAL; INTRAMUSCULAR; INTRAVENOUS; SOFT TISSUE
Status: DISPENSED
Start: 2020-05-12

## (undated) RX ORDER — CEFAZOLIN SODIUM 2 G/100ML
INJECTION, SOLUTION INTRAVENOUS
Status: DISPENSED
Start: 2021-01-16

## (undated) RX ORDER — PHENYLEPHRINE HCL IN 0.9% NACL 1 MG/10 ML
SYRINGE (ML) INTRAVENOUS
Status: DISPENSED
Start: 2020-03-26

## (undated) RX ORDER — INDOMETHACIN 50 MG/1
SUPPOSITORY RECTAL
Status: DISPENSED
Start: 2021-01-15

## (undated) RX ORDER — DEXAMETHASONE SODIUM PHOSPHATE 4 MG/ML
INJECTION, SOLUTION INTRA-ARTICULAR; INTRALESIONAL; INTRAMUSCULAR; INTRAVENOUS; SOFT TISSUE
Status: DISPENSED
Start: 2021-01-16

## (undated) RX ORDER — FENTANYL CITRATE 50 UG/ML
INJECTION, SOLUTION INTRAMUSCULAR; INTRAVENOUS
Status: DISPENSED
Start: 2021-01-16

## (undated) RX ORDER — CEFAZOLIN SODIUM 2 G/100ML
INJECTION, SOLUTION INTRAVENOUS
Status: DISPENSED
Start: 2020-05-12

## (undated) RX ORDER — DEXAMETHASONE SODIUM PHOSPHATE 4 MG/ML
INJECTION, SOLUTION INTRA-ARTICULAR; INTRALESIONAL; INTRAMUSCULAR; INTRAVENOUS; SOFT TISSUE
Status: DISPENSED
Start: 2021-01-15

## (undated) RX ORDER — OXYCODONE HYDROCHLORIDE 5 MG/1
TABLET ORAL
Status: DISPENSED
Start: 2021-01-16

## (undated) RX ORDER — ONDANSETRON 2 MG/ML
INJECTION INTRAMUSCULAR; INTRAVENOUS
Status: DISPENSED
Start: 2021-01-15

## (undated) RX ORDER — ONDANSETRON 2 MG/ML
INJECTION INTRAMUSCULAR; INTRAVENOUS
Status: DISPENSED
Start: 2020-05-12

## (undated) RX ORDER — ONDANSETRON 2 MG/ML
INJECTION INTRAMUSCULAR; INTRAVENOUS
Status: DISPENSED
Start: 2021-01-16

## (undated) RX ORDER — PROPOFOL 10 MG/ML
INJECTION, EMULSION INTRAVENOUS
Status: DISPENSED
Start: 2020-05-12

## (undated) RX ORDER — PROPOFOL 10 MG/ML
INJECTION, EMULSION INTRAVENOUS
Status: DISPENSED
Start: 2021-01-16

## (undated) RX ORDER — FENTANYL CITRATE 50 UG/ML
INJECTION, SOLUTION INTRAMUSCULAR; INTRAVENOUS
Status: DISPENSED
Start: 2020-05-12

## (undated) RX ORDER — PROPOFOL 10 MG/ML
INJECTION, EMULSION INTRAVENOUS
Status: DISPENSED
Start: 2020-03-26

## (undated) RX ORDER — GLYCOPYRROLATE 0.2 MG/ML
INJECTION INTRAMUSCULAR; INTRAVENOUS
Status: DISPENSED
Start: 2021-01-16

## (undated) RX ORDER — LIDOCAINE HYDROCHLORIDE 10 MG/ML
INJECTION, SOLUTION EPIDURAL; INFILTRATION; INTRACAUDAL; PERINEURAL
Status: DISPENSED
Start: 2021-01-16

## (undated) RX ORDER — SCOLOPAMINE TRANSDERMAL SYSTEM 1 MG/1
PATCH, EXTENDED RELEASE TRANSDERMAL
Status: DISPENSED
Start: 2020-03-26

## (undated) RX ORDER — LIDOCAINE HYDROCHLORIDE 10 MG/ML
INJECTION, SOLUTION EPIDURAL; INFILTRATION; INTRACAUDAL; PERINEURAL
Status: DISPENSED
Start: 2020-03-26

## (undated) RX ORDER — ONDANSETRON 2 MG/ML
INJECTION INTRAMUSCULAR; INTRAVENOUS
Status: DISPENSED
Start: 2020-03-26

## (undated) RX ORDER — BUPIVACAINE HYDROCHLORIDE AND EPINEPHRINE 5; 5 MG/ML; UG/ML
INJECTION, SOLUTION EPIDURAL; INTRACAUDAL; PERINEURAL
Status: DISPENSED
Start: 2021-01-16

## (undated) RX ORDER — FENTANYL CITRATE 50 UG/ML
INJECTION, SOLUTION INTRAMUSCULAR; INTRAVENOUS
Status: DISPENSED
Start: 2021-01-15

## (undated) RX ORDER — KETOROLAC TROMETHAMINE 30 MG/ML
INJECTION, SOLUTION INTRAMUSCULAR; INTRAVENOUS
Status: DISPENSED
Start: 2021-01-16

## (undated) RX ORDER — SCOLOPAMINE TRANSDERMAL SYSTEM 1 MG/1
PATCH, EXTENDED RELEASE TRANSDERMAL
Status: DISPENSED
Start: 2020-05-12

## (undated) RX ORDER — CEFAZOLIN SODIUM 2 G/100ML
INJECTION, SOLUTION INTRAVENOUS
Status: DISPENSED
Start: 2020-03-26

## (undated) RX ORDER — DEXAMETHASONE SODIUM PHOSPHATE 4 MG/ML
INJECTION, SOLUTION INTRA-ARTICULAR; INTRALESIONAL; INTRAMUSCULAR; INTRAVENOUS; SOFT TISSUE
Status: DISPENSED
Start: 2020-03-26

## (undated) RX ORDER — NEOSTIGMINE METHYLSULFATE 1 MG/ML
VIAL (ML) INJECTION
Status: DISPENSED
Start: 2021-01-16

## (undated) RX ORDER — LIDOCAINE HYDROCHLORIDE 10 MG/ML
INJECTION, SOLUTION EPIDURAL; INFILTRATION; INTRACAUDAL; PERINEURAL
Status: DISPENSED
Start: 2020-05-12

## (undated) RX ORDER — LIDOCAINE HYDROCHLORIDE 10 MG/ML
INJECTION, SOLUTION EPIDURAL; INFILTRATION; INTRACAUDAL; PERINEURAL
Status: DISPENSED
Start: 2021-01-15